# Patient Record
Sex: MALE | Race: WHITE | Employment: OTHER | URBAN - METROPOLITAN AREA
[De-identification: names, ages, dates, MRNs, and addresses within clinical notes are randomized per-mention and may not be internally consistent; named-entity substitution may affect disease eponyms.]

---

## 2017-01-11 RX ORDER — CLONAZEPAM 0.5 MG/1
0.5 TABLET ORAL NIGHTLY PRN
Qty: 30 TABLET | Refills: 0 | Status: SHIPPED | OUTPATIENT
Start: 2017-01-11

## 2017-05-02 PROBLEM — R00.2 HEART PALPITATIONS: Status: ACTIVE | Noted: 2017-05-02

## 2017-05-03 ENCOUNTER — OFFICE VISIT (OUTPATIENT)
Dept: CARDIOLOGY | Age: 67
End: 2017-05-03

## 2017-05-03 VITALS
BODY MASS INDEX: 25.71 KG/M2 | WEIGHT: 194 LBS | HEART RATE: 58 BPM | HEIGHT: 73 IN | RESPIRATION RATE: 12 BRPM | OXYGEN SATURATION: 98 % | DIASTOLIC BLOOD PRESSURE: 60 MMHG | SYSTOLIC BLOOD PRESSURE: 110 MMHG

## 2017-05-03 DIAGNOSIS — I48.91 ATRIAL FIBRILLATION, UNSPECIFIED TYPE (HCC): Primary | ICD-10-CM

## 2017-05-03 DIAGNOSIS — I10 ESSENTIAL HYPERTENSION: ICD-10-CM

## 2017-05-03 PROCEDURE — 99213 OFFICE O/P EST LOW 20 MIN: CPT | Performed by: INTERNAL MEDICINE

## 2017-05-03 RX ORDER — METOPROLOL SUCCINATE 25 MG/1
12.5 TABLET, EXTENDED RELEASE ORAL NIGHTLY
Refills: 0 | COMMUNITY
Start: 2017-04-28

## 2017-05-03 ASSESSMENT — ENCOUNTER SYMPTOMS
CHEST TIGHTNESS: 0
SHORTNESS OF BREATH: 0

## 2017-05-23 ENCOUNTER — HOSPITAL ENCOUNTER (OUTPATIENT)
Dept: NON INVASIVE DIAGNOSTICS | Age: 67
Discharge: HOME | End: 2017-05-23
Payer: COMMERCIAL

## 2017-05-24 PROBLEM — I48.91 ATRIAL FIBRILLATION (HCC): Status: ACTIVE | Noted: 2017-05-02

## 2017-05-31 ENCOUNTER — TELEPHONE (OUTPATIENT)
Dept: CARDIOLOGY | Age: 67
End: 2017-05-31

## 2017-09-01 ENCOUNTER — HOSPITAL ENCOUNTER (OUTPATIENT)
Dept: LAB | Age: 67
Discharge: HOME | End: 2017-09-01
Payer: COMMERCIAL

## 2017-09-01 LAB
ALBUMIN SERPL-MCNC: 4.6 G/DL (ref 3.9–4.9)
ALP BLD-CCNC: 42 U/L (ref 35–104)
ALT SERPL-CCNC: 10 U/L (ref 0–41)
ANION GAP SERPL CALCULATED.3IONS-SCNC: 13 MEQ/L (ref 7–13)
ANISOCYTOSIS: ABNORMAL
AST SERPL-CCNC: 18 U/L (ref 0–40)
ATYPICAL LYMPHOCYTE RELATIVE PERCENT: 1 %
BASOPHILS ABSOLUTE: 0 K/UL (ref 0–0.2)
BASOPHILS RELATIVE PERCENT: 1 %
BILIRUB SERPL-MCNC: 0.8 MG/DL (ref 0–1.2)
BUN BLDV-MCNC: 17 MG/DL (ref 8–23)
CALCIUM SERPL-MCNC: 9.5 MG/DL (ref 8.6–10.2)
CHLORIDE BLD-SCNC: 100 MEQ/L (ref 98–107)
CHOLESTEROL, TOTAL: 144 MG/DL (ref 0–199)
CO2: 26 MEQ/L (ref 22–29)
CREAT SERPL-MCNC: 0.73 MG/DL (ref 0.7–1.2)
EOSINOPHILS ABSOLUTE: 0 K/UL (ref 0–0.7)
EOSINOPHILS RELATIVE PERCENT: 1 %
GFR AFRICAN AMERICAN: >60
GFR NON-AFRICAN AMERICAN: >60
GLOBULIN: 2 G/DL (ref 2.3–3.5)
GLUCOSE BLD-MCNC: 102 MG/DL (ref 74–109)
HCT VFR BLD CALC: 45.5 % (ref 42–52)
HDLC SERPL-MCNC: 76 MG/DL (ref 40–59)
HEMOGLOBIN: 15.2 G/DL (ref 14–18)
HYPOCHROMIA: 0
LDL CHOLESTEROL CALCULATED: 56 MG/DL (ref 0–129)
LYMPHOCYTES ABSOLUTE: 1 K/UL (ref 1–4.8)
LYMPHOCYTES RELATIVE PERCENT: 26 %
MACROCYTES: 0
MAGNESIUM: 2.4 MG/DL (ref 1.7–2.3)
MCH RBC QN AUTO: 30.6 PG (ref 27–31.3)
MCHC RBC AUTO-ENTMCNC: 33.4 % (ref 33–37)
MCV RBC AUTO: 91.7 FL (ref 80–100)
MICROCYTES: ABNORMAL
MONOCYTES ABSOLUTE: 0.4 K/UL (ref 0.2–0.8)
MONOCYTES RELATIVE PERCENT: 11.3 %
NEUTROPHILS ABSOLUTE: 2.2 K/UL (ref 1.4–6.5)
NEUTROPHILS RELATIVE PERCENT: 59 %
PDW BLD-RTO: 13.7 % (ref 11.5–14.5)
PLATELET # BLD: 232 K/UL (ref 130–400)
PLATELET SLIDE REVIEW: ADEQUATE
POIKILOCYTES: 0
POLYCHROMASIA: 0
POTASSIUM SERPL-SCNC: 4.6 MEQ/L (ref 3.5–5.1)
RBC # BLD: 4.96 M/UL (ref 4.7–6.1)
SMUDGE CELLS: 2.8
SODIUM BLD-SCNC: 139 MEQ/L (ref 132–144)
T3 FREE: 3 PG/ML (ref 2–4.4)
TOTAL PROTEIN: 6.6 G/DL (ref 6.4–8.1)
TRIGL SERPL-MCNC: 58 MG/DL (ref 0–200)
TSH REFLEX: 2 UIU/ML (ref 0.27–4.2)
VITAMIN B-12: 560 PG/ML (ref 211–946)
WBC # BLD: 3.8 K/UL (ref 4.8–10.8)

## 2017-09-01 PROCEDURE — 85025 COMPLETE CBC W/AUTO DIFF WBC: CPT

## 2017-09-01 PROCEDURE — 36415 COLL VENOUS BLD VENIPUNCTURE: CPT

## 2017-09-01 PROCEDURE — 82607 VITAMIN B-12: CPT

## 2017-09-01 PROCEDURE — 80053 COMPREHEN METABOLIC PANEL: CPT

## 2017-09-01 PROCEDURE — 84443 ASSAY THYROID STIM HORMONE: CPT

## 2017-09-01 PROCEDURE — 80061 LIPID PANEL: CPT

## 2017-09-01 PROCEDURE — 84481 FREE ASSAY (FT-3): CPT

## 2017-09-01 PROCEDURE — 83735 ASSAY OF MAGNESIUM: CPT

## 2020-09-30 ENCOUNTER — HOSPITAL ENCOUNTER (EMERGENCY)
Age: 70
Discharge: HOME | End: 2020-09-30
Attending: FAMILY MEDICINE
Payer: MEDICARE

## 2020-09-30 VITALS
HEART RATE: 56 BPM | BODY MASS INDEX: 25.71 KG/M2 | DIASTOLIC BLOOD PRESSURE: 73 MMHG | HEIGHT: 73 IN | SYSTOLIC BLOOD PRESSURE: 121 MMHG | TEMPERATURE: 97.9 F | OXYGEN SATURATION: 98 % | RESPIRATION RATE: 17 BRPM | WEIGHT: 194 LBS

## 2020-09-30 LAB
ALBUMIN SERPL-MCNC: 4.2 G/DL (ref 3.5–4.6)
ALP BLD-CCNC: 47 U/L (ref 35–104)
ALT SERPL-CCNC: 11 U/L (ref 0–41)
ANION GAP SERPL CALCULATED.3IONS-SCNC: 7 MEQ/L (ref 9–15)
APTT: 35.4 SEC (ref 24.4–36.8)
AST SERPL-CCNC: 16 U/L (ref 0–40)
BASOPHILS ABSOLUTE: 0 K/UL (ref 0–0.2)
BASOPHILS RELATIVE PERCENT: 0.8 %
BILIRUB SERPL-MCNC: 0.4 MG/DL (ref 0.2–0.7)
BUN BLDV-MCNC: 18 MG/DL (ref 8–23)
CALCIUM SERPL-MCNC: 9.6 MG/DL (ref 8.5–9.9)
CHLORIDE BLD-SCNC: 104 MEQ/L (ref 95–107)
CO2: 29 MEQ/L (ref 20–31)
CREAT SERPL-MCNC: 0.84 MG/DL (ref 0.7–1.2)
EKG ATRIAL RATE: 62 BPM
EKG P AXIS: 19 DEGREES
EKG P-R INTERVAL: 176 MS
EKG Q-T INTERVAL: 428 MS
EKG QRS DURATION: 100 MS
EKG QTC CALCULATION (BAZETT): 434 MS
EKG R AXIS: -28 DEGREES
EKG T AXIS: 34 DEGREES
EKG VENTRICULAR RATE: 62 BPM
EOSINOPHILS ABSOLUTE: 0 K/UL (ref 0–0.7)
EOSINOPHILS RELATIVE PERCENT: 1 %
GFR AFRICAN AMERICAN: >60
GFR NON-AFRICAN AMERICAN: >60
GLOBULIN: 3.2 G/DL (ref 2.3–3.5)
GLUCOSE BLD-MCNC: 100 MG/DL (ref 70–99)
HCT VFR BLD CALC: 43.3 % (ref 42–52)
HEMOGLOBIN: 14.5 G/DL (ref 14–18)
INR BLD: 1.1
LYMPHOCYTES ABSOLUTE: 1.2 K/UL (ref 1–4.8)
LYMPHOCYTES RELATIVE PERCENT: 26.6 %
MCH RBC QN AUTO: 31.2 PG (ref 27–31.3)
MCHC RBC AUTO-ENTMCNC: 33.4 % (ref 33–37)
MCV RBC AUTO: 93.4 FL (ref 80–100)
MONOCYTES ABSOLUTE: 0.3 K/UL (ref 0.2–0.8)
MONOCYTES RELATIVE PERCENT: 6.7 %
NEUTROPHILS ABSOLUTE: 3 K/UL (ref 1.4–6.5)
NEUTROPHILS RELATIVE PERCENT: 64.9 %
PDW BLD-RTO: 13.8 % (ref 11.5–14.5)
PLATELET # BLD: 257 K/UL (ref 130–400)
POTASSIUM SERPL-SCNC: 3.8 MEQ/L (ref 3.4–4.9)
PROTHROMBIN TIME: 14.2 SEC (ref 12.3–14.9)
RBC # BLD: 4.63 M/UL (ref 4.7–6.1)
SODIUM BLD-SCNC: 140 MEQ/L (ref 135–144)
TOTAL CK: 80 U/L (ref 0–190)
TOTAL PROTEIN: 7.4 G/DL (ref 6.3–8)
TROPONIN: <0.01 NG/ML (ref 0–0.01)
WBC # BLD: 4.6 K/UL (ref 4.8–10.8)

## 2020-09-30 PROCEDURE — 85730 THROMBOPLASTIN TIME PARTIAL: CPT

## 2020-09-30 PROCEDURE — 36415 COLL VENOUS BLD VENIPUNCTURE: CPT

## 2020-09-30 PROCEDURE — 85025 COMPLETE CBC W/AUTO DIFF WBC: CPT

## 2020-09-30 PROCEDURE — 85610 PROTHROMBIN TIME: CPT

## 2020-09-30 PROCEDURE — 84484 ASSAY OF TROPONIN QUANT: CPT

## 2020-09-30 PROCEDURE — 93005 ELECTROCARDIOGRAM TRACING: CPT | Performed by: FAMILY MEDICINE

## 2020-09-30 PROCEDURE — 99285 EMERGENCY DEPT VISIT HI MDM: CPT

## 2020-09-30 PROCEDURE — 82550 ASSAY OF CK (CPK): CPT

## 2020-09-30 PROCEDURE — 80053 COMPREHEN METABOLIC PANEL: CPT

## 2020-09-30 PROCEDURE — 99284 EMERGENCY DEPT VISIT MOD MDM: CPT

## 2020-09-30 RX ORDER — PRAVASTATIN SODIUM 10 MG
5 TABLET ORAL DAILY
COMMUNITY

## 2020-09-30 RX ORDER — LOSARTAN POTASSIUM 25 MG/1
25 TABLET ORAL DAILY
COMMUNITY

## 2020-09-30 ASSESSMENT — PAIN DESCRIPTION - ORIENTATION: ORIENTATION: MID

## 2020-09-30 ASSESSMENT — ENCOUNTER SYMPTOMS
EYES NEGATIVE: 1
RESPIRATORY NEGATIVE: 1
ALLERGIC/IMMUNOLOGIC NEGATIVE: 1

## 2020-09-30 ASSESSMENT — HEART SCORE: ECG: 0

## 2020-09-30 ASSESSMENT — PAIN SCALES - GENERAL: PAINLEVEL_OUTOF10: 2

## 2020-09-30 ASSESSMENT — PAIN DESCRIPTION - FREQUENCY: FREQUENCY: CONTINUOUS

## 2020-09-30 ASSESSMENT — PAIN DESCRIPTION - PAIN TYPE: TYPE: ACUTE PAIN

## 2020-09-30 ASSESSMENT — PAIN DESCRIPTION - LOCATION: LOCATION: CHEST

## 2020-09-30 ASSESSMENT — PAIN DESCRIPTION - DESCRIPTORS: DESCRIPTORS: ACHING;DULL;PRESSURE

## 2020-09-30 NOTE — ED NOTES
Discharge instructions explained to patient with verbalization of understanding. Patient ambulatory with a steady gait.       Viola Hidalgo RN  09/30/20 8666

## 2020-09-30 NOTE — ED TRIAGE NOTES
A & Ox4. Skin pink warm and dry. Points to center of chest for pains. Denies radiation. Denies N/V/SOB/diaphoresis/dizziness. States pain is worse at the top of a deep breath.

## 2020-09-30 NOTE — ED PROVIDER NOTES
[facility] ED  eMERGENCY dEPARTMENT eNCOUnter      Pt Name: Radha Ruff  MRN: [id number]  [place] 1950  Date of evaluation: 9/30/2020  Provider: Karina Herrmann MD    [address] [place] [address]       Chief Complaint   Patient presents with    Chest Pain     x 2 days off and on, being pretty constant since last night         HISTORY OF PRESENT ILLNESS   (Location/Symptom, Timing/Onset,Context/Setting, Quality, Duration, Modifying Factors, Severity)  Note limiting factors. Radha Ruff is a 69 y.o. male who presents to the emergency department      The history is provided by the patient. Chest Pain   Pain location:  L chest  Pain quality: aching    Pain radiates to:  Does not radiate  Pain severity:  Moderate  Timing:  Constant  Progression:  Waxing and waning  Chronicity:  New  Context: not breathing, not drug use, not eating, not intercourse, not lifting, not movement, not raising an arm, not at rest, not stress and not trauma        NursingNotes were reviewed. REVIEW OF SYSTEMS    (2-9 systems for level 4, 10 or more for level 5)     Review of Systems   Constitutional: Negative. HENT: Negative. Eyes: Negative. Respiratory: Negative. Cardiovascular: Positive for chest pain. Endocrine: Negative. Genitourinary: Negative. Musculoskeletal: Negative. Skin: Negative. Allergic/Immunologic: Negative. Neurological: Negative. Hematological: Negative. Psychiatric/Behavioral: Negative. All other systems reviewed and are negative. Except as noted above the remainder of the review of systems was reviewed and negative.        PAST MEDICAL HISTORY     Past Medical History:   Diagnosis Date    Atrial fibrillation ([address] 5/2/2017    Dysthymia     Hyperlipidemia     Hypertension     Plantar fasciitis     RT          SURGICALHISTORY       Past Surgical History:   Procedure Laterality Date    CATARACT REMOVAL WITH IMPLANT Bilateral 01/2016    TONSILLECTOMY CURRENT MEDICATIONS       Previous Medications    APIXABAN (ELIQUIS) 5 MG TABS TABLET    Take 5 mg by mouth 2 times daily    CLONAZEPAM (KLONOPIN) 0.5 MG TABLET    Take 1 tablet by mouth nightly as needed for Anxiety    LISINOPRIL (PRINIVIL;ZESTRIL) 10 MG TABLET    0.5 tab PO a day    LOSARTAN (COZAAR) 25 MG TABLET    Take 25 mg by mouth daily    METOPROLOL SUCCINATE (TOPROL XL) 25 MG EXTENDED RELEASE TABLET    12.5 mg nightly    PRAVASTATIN (PRAVACHOL) 10 MG TABLET    Take 5 mg by mouth daily Takes 5mg and then every 3rd day takes 10mg    SIMVASTATIN (ZOCOR) 10 MG TABLET    Take 1 tablet by mouth daily       ALLERGIES     Patient has no known allergies.     FAMILY HISTORY       Family History   Problem Relation Age of Onset    Heart Disease Mother     Cancer Mother         breast/bone mets    Alzheimer's Disease Father     Dementia Father           SOCIAL HISTORY       Social History     Socioeconomic History    Marital status:      Spouse name: None    Number of children: None    Years of education: None    Highest education level: None   Occupational History    None   Social Needs    Financial resource strain: None    Food insecurity     Worry: None     Inability: None    Transportation needs     Medical: None     Non-medical: None   Tobacco Use    Smoking status: Never Smoker    Smokeless tobacco: Never Used   Substance and Sexual Activity    Alcohol use: Yes     Comment: rarely     Drug use: Not Currently    Sexual activity: Yes     Partners: Female   Lifestyle    Physical activity     Days per week: None     Minutes per session: None    Stress: None   Relationships    Social connections     Talks on phone: None     Gets together: None     Attends Congregational service: None     Active member of club or organization: None     Attends meetings of clubs or organizations: None     Relationship status: None    Intimate partner violence     Fear of current or ex partner: None Emotionally abused: None     Physically abused: None     Forced sexual activity: None   Other Topics Concern    None   Social History Narrative    None       SCREENINGS      @FLOW([id number])@      PHYSICAL EXAM    (up to 7 for level 4, 8 or more for level 5)     ED Triage Vitals [09/30/20 1349]   BP Temp Temp Source Pulse Resp SpO2 Height Weight   (!) 163/91 97.9 °F (36.6 °C) Oral 65 16 98 % 6' 1\" (1.854 m) 194 lb (88 kg)       Physical Exam  Vitals signs and nursing note reviewed. Constitutional:       Appearance: He is well-developed. HENT:      Head: Normocephalic and atraumatic. Right Ear: External ear normal.      Left Ear: External ear normal.      Nose: Nose normal.      Mouth/Throat:      Mouth: Mucous membranes are moist.   Eyes:      Extraocular Movements: Extraocular movements intact. Pupils: Pupils are equal, round, and reactive to light. Neck:      Musculoskeletal: Normal range of motion and neck supple. Cardiovascular:      Rate and Rhythm: Normal rate and regular rhythm. Heart sounds: Normal heart sounds. Pulmonary:      Effort: Pulmonary effort is normal. No respiratory distress. Breath sounds: Normal breath sounds. No stridor. No wheezing, rhonchi or rales. Chest:      Chest wall: No tenderness. Abdominal:      General: Bowel sounds are normal.      Palpations: Abdomen is soft. Musculoskeletal: Normal range of motion. Skin:     General: Skin is warm and dry. Neurological:      General: No focal deficit present. Mental Status: He is alert and oriented to person, place, and time. Cranial Nerves: No cranial nerve deficit. Sensory: No sensory deficit. Motor: No weakness or abnormal muscle tone. Coordination: Coordination normal.      Gait: Gait normal.      Deep Tendon Reflexes: Reflexes normal.   Psychiatric:         Mood and Affect: Mood normal.         Behavior: Behavior normal.         Thought Content:  Thought content normal. Judgment: Judgment normal.         DIAGNOSTIC RESULTS     EKG: All EKG's are interpreted by the Emergency Department Physician who either signs or Co-signsthis chart in the absence of a cardiologist.    EKG: normal EKG, normal sinus rhythm, rate 62 and no change for previous EKG  . RADIOLOGY:   Non-plain filmimages such as CT, Ultrasound and MRI are read by the radiologist. Plain radiographic images are visualized and preliminarily interpreted by the emergency physician with the below findings:         Interpretation per the Radiologist below, if available at the time ofthis note:    No orders to display         ED BEDSIDE ULTRASOUND:   Performed by ED Physician - none    LABS:  Labs Reviewed   COMPREHENSIVE METABOLIC PANEL - Abnormal; Notable for the following components:       Result Value    Anion Gap 7 (*)     Glucose 100 (*)     All other components within normal limits   CBC WITH AUTO DIFFERENTIAL - Abnormal; Notable for the following components:    WBC 4.6 (*)     RBC 4.63 (*)     All other components within normal limits   PROTIME-INR   APTT   CK   TROPONIN       All other labs were within normal range or not returned as of this dictation. EMERGENCY DEPARTMENT COURSE and DIFFERENTIAL DIAGNOSIS/MDM:   Vitals:    Vitals:    09/30/20 1530 09/30/20 1544 09/30/20 1600 09/30/20 1630   BP: (!) 149/94 (!) 149/94 133/81 135/84   Pulse: 54 56 60 57   Resp: 18 18 14 14   Temp:       TempSrc:       SpO2: 98%  98% 98%   Weight:       Height:                    MDM  Number of Diagnoses or Management Options  Diagnosis management comments: The patient presents with chest pain. The patient has been evaluated and the history and physical exam suggest a benign etiology. I see nothing to suggest coronary ischemia, myocardial infarction, pulmonary embolism, thoracic aortic dissection, significant pericarditis, pneumonia, pneumothorax, or acute abdomen.   I feel the patient can be safely discharged to home with outpatient follow up. Instructions have been given for the patient to return to the ED for any worsening of the symptoms, including but not limited to increased pain, shortness of breath, abdominal pain or weakness. Patient was offered to stay for observation overnight but he has known history of hypertension A. fib controlled failure and preferred to follow-up with his cardiologist Dr. Carol Ann Odonnell in the morning advised to come to the ER if any worsening or new symptom       Amount and/or Complexity of Data Reviewed  Clinical lab tests: ordered and reviewed  Tests in the radiology section of CPT®: ordered and reviewed      CONSULTS:  None    PROCEDURES:  Unless otherwise noted below, none     Procedures    FINAL IMPRESSION      1.  Atypical chest pain          DISPOSITION/PLAN   DISPOSITION        PATIENT REFERRED TO:  Lorri Bustillos MD  [facility] [address]  [phone]    In 2 days        DISCHARGE MEDICATIONS:  New Prescriptions    No medications on file          (Please note thatportions of this note were completed with a voice recognition program.  Efforts were made to edit the dictations but occasionally words are mis-transcribed.)    Savanna Roa MD (electronically signed)  Attending Emergency Physician          Davy Lopez MD  09/30/20 4300

## 2020-09-30 NOTE — ED NOTES
Pt states his pain is about 1/10 at this time. Aware we are waiting on rest of results. Pt has call light within reach. No further complaints.       Emily Gregorio RN  09/30/20 5999

## 2020-10-03 PROCEDURE — 93010 ELECTROCARDIOGRAM REPORT: CPT | Performed by: INTERNAL MEDICINE

## 2022-07-16 ENCOUNTER — HOSPITAL ENCOUNTER (EMERGENCY)
Age: 72
Discharge: HOME | End: 2022-07-17
Payer: MEDICARE

## 2022-07-16 ENCOUNTER — APPOINTMENT (OUTPATIENT)
Dept: GENERAL RADIOLOGY | Age: 72
End: 2022-07-16
Payer: MEDICARE

## 2022-07-16 DIAGNOSIS — R07.9 CHEST PAIN, UNSPECIFIED TYPE: Primary | ICD-10-CM

## 2022-07-16 LAB
ALBUMIN SERPL-MCNC: 4.3 G/DL (ref 3.5–4.6)
ALP BLD-CCNC: 54 U/L (ref 35–104)
ALT SERPL-CCNC: 15 U/L (ref 0–41)
ANION GAP SERPL CALCULATED.3IONS-SCNC: 10 MEQ/L (ref 9–15)
AST SERPL-CCNC: 20 U/L (ref 0–40)
BASOPHILS ABSOLUTE: 0 K/UL (ref 0–0.2)
BASOPHILS RELATIVE PERCENT: 0.7 %
BILIRUB SERPL-MCNC: <0.2 MG/DL (ref 0.2–0.7)
BUN BLDV-MCNC: 23 MG/DL (ref 8–23)
CALCIUM SERPL-MCNC: 9.3 MG/DL (ref 8.5–9.9)
CHLORIDE BLD-SCNC: 103 MEQ/L (ref 95–107)
CO2: 25 MEQ/L (ref 20–31)
CREAT SERPL-MCNC: 0.89 MG/DL (ref 0.7–1.2)
EOSINOPHILS ABSOLUTE: 0.1 K/UL (ref 0–0.7)
EOSINOPHILS RELATIVE PERCENT: 1.5 %
GFR AFRICAN AMERICAN: >60
GFR NON-AFRICAN AMERICAN: >60
GLOBULIN: 3 G/DL (ref 2.3–3.5)
GLUCOSE BLD-MCNC: 112 MG/DL (ref 70–99)
HCT VFR BLD CALC: 43.3 % (ref 42–52)
HEMOGLOBIN: 14.6 G/DL (ref 14–18)
LYMPHOCYTES ABSOLUTE: 1.8 K/UL (ref 1–4.8)
LYMPHOCYTES RELATIVE PERCENT: 38.8 %
MAGNESIUM: 2.4 MG/DL (ref 1.7–2.4)
MCH RBC QN AUTO: 31.4 PG (ref 27–31.3)
MCHC RBC AUTO-ENTMCNC: 33.8 % (ref 33–37)
MCV RBC AUTO: 93.1 FL (ref 80–100)
MONOCYTES ABSOLUTE: 0.4 K/UL (ref 0.2–0.8)
MONOCYTES RELATIVE PERCENT: 8.4 %
NEUTROPHILS ABSOLUTE: 2.3 K/UL (ref 1.4–6.5)
NEUTROPHILS RELATIVE PERCENT: 50.6 %
PDW BLD-RTO: 14.1 % (ref 11.5–14.5)
PLATELET # BLD: 245 K/UL (ref 130–400)
POTASSIUM SERPL-SCNC: 4 MEQ/L (ref 3.4–4.9)
RBC # BLD: 4.65 M/UL (ref 4.7–6.1)
SODIUM BLD-SCNC: 138 MEQ/L (ref 135–144)
TOTAL PROTEIN: 7.3 G/DL (ref 6.3–8)
TROPONIN: <0.01 NG/ML (ref 0–0.01)
WBC # BLD: 4.6 K/UL (ref 4.8–10.8)

## 2022-07-16 PROCEDURE — 85025 COMPLETE CBC W/AUTO DIFF WBC: CPT

## 2022-07-16 PROCEDURE — 93005 ELECTROCARDIOGRAM TRACING: CPT | Performed by: STUDENT IN AN ORGANIZED HEALTH CARE EDUCATION/TRAINING PROGRAM

## 2022-07-16 PROCEDURE — 84484 ASSAY OF TROPONIN QUANT: CPT

## 2022-07-16 PROCEDURE — 80053 COMPREHEN METABOLIC PANEL: CPT

## 2022-07-16 PROCEDURE — 99285 EMERGENCY DEPT VISIT HI MDM: CPT

## 2022-07-16 PROCEDURE — 2580000003 HC RX 258: Performed by: STUDENT IN AN ORGANIZED HEALTH CARE EDUCATION/TRAINING PROGRAM

## 2022-07-16 PROCEDURE — 71045 X-RAY EXAM CHEST 1 VIEW: CPT

## 2022-07-16 PROCEDURE — 36415 COLL VENOUS BLD VENIPUNCTURE: CPT

## 2022-07-16 PROCEDURE — 83735 ASSAY OF MAGNESIUM: CPT

## 2022-07-16 RX ORDER — 0.9 % SODIUM CHLORIDE 0.9 %
1000 INTRAVENOUS SOLUTION INTRAVENOUS ONCE
Status: COMPLETED | OUTPATIENT
Start: 2022-07-16 | End: 2022-07-17

## 2022-07-16 RX ORDER — ASPIRIN 325 MG
325 TABLET ORAL ONCE
Status: DISCONTINUED | OUTPATIENT
Start: 2022-07-16 | End: 2022-07-16

## 2022-07-16 RX ADMIN — SODIUM CHLORIDE 1000 ML: 9 INJECTION, SOLUTION INTRAVENOUS at 23:31

## 2022-07-16 ASSESSMENT — PAIN - FUNCTIONAL ASSESSMENT: PAIN_FUNCTIONAL_ASSESSMENT: 0-10

## 2022-07-16 ASSESSMENT — PAIN DESCRIPTION - ORIENTATION: ORIENTATION: LEFT

## 2022-07-16 ASSESSMENT — ENCOUNTER SYMPTOMS
SHORTNESS OF BREATH: 0
CHEST TIGHTNESS: 0
NAUSEA: 0
DIARRHEA: 0
SORE THROAT: 0
BACK PAIN: 0
EYE PAIN: 0

## 2022-07-16 ASSESSMENT — PAIN DESCRIPTION - LOCATION: LOCATION: CHEST;SHOULDER

## 2022-07-16 ASSESSMENT — PAIN SCALES - GENERAL: PAINLEVEL_OUTOF10: 3

## 2022-07-17 VITALS
WEIGHT: 186 LBS | HEART RATE: 59 BPM | DIASTOLIC BLOOD PRESSURE: 89 MMHG | TEMPERATURE: 98.3 F | OXYGEN SATURATION: 97 % | HEIGHT: 73 IN | RESPIRATION RATE: 16 BRPM | BODY MASS INDEX: 24.65 KG/M2 | SYSTOLIC BLOOD PRESSURE: 123 MMHG

## 2022-07-17 LAB — TROPONIN: <0.01 NG/ML (ref 0–0.01)

## 2022-07-17 PROCEDURE — 84484 ASSAY OF TROPONIN QUANT: CPT

## 2022-07-17 PROCEDURE — 36415 COLL VENOUS BLD VENIPUNCTURE: CPT

## 2022-07-17 PROCEDURE — 93005 ELECTROCARDIOGRAM TRACING: CPT | Performed by: STUDENT IN AN ORGANIZED HEALTH CARE EDUCATION/TRAINING PROGRAM

## 2022-07-17 ASSESSMENT — PAIN DESCRIPTION - LOCATION: LOCATION: CHEST

## 2022-07-17 ASSESSMENT — PAIN - FUNCTIONAL ASSESSMENT
PAIN_FUNCTIONAL_ASSESSMENT: ACTIVITIES ARE NOT PREVENTED
PAIN_FUNCTIONAL_ASSESSMENT: 0-10
PAIN_FUNCTIONAL_ASSESSMENT: NONE - DENIES PAIN

## 2022-07-17 ASSESSMENT — PAIN DESCRIPTION - FREQUENCY: FREQUENCY: CONTINUOUS

## 2022-07-17 ASSESSMENT — PAIN SCALES - GENERAL: PAINLEVEL_OUTOF10: 3

## 2022-07-17 ASSESSMENT — PAIN DESCRIPTION - PAIN TYPE: TYPE: ACUTE PAIN

## 2022-07-17 ASSESSMENT — PAIN DESCRIPTION - ONSET: ONSET: ON-GOING

## 2022-07-17 ASSESSMENT — PAIN DESCRIPTION - ORIENTATION: ORIENTATION: LEFT

## 2022-07-17 NOTE — ED TRIAGE NOTES
Patient to ED via [facility] for c/o of chest pain x 45 minutes. Patient has hx of afib. Denies hx of heart attack. Patient Skin p/w/d. Respirations even and unlabored. No acute distress noted at this time. Pa Chip at bedside.

## 2022-07-17 NOTE — ED PROVIDER NOTES
[address] [facility] ED  eMERGENCYdEPARTMENT eNCOUnter      Pt Name: Nehal Hernandez  MRN: [id number]  [place] 1950  Date of evaluation: 7/16/2022  Millie Ross PA-C    CHIEF COMPLAINT           HPI  Nehal Hernandez is a 67 y.o. male per chart review has a h/o hypertension, hyperlipidemia, A. fib presenting with chest pain. Patient reports gradual onset, worsening, moderate, substernal chest pressure that radiated to his left side and associated with diaphoresis and weakness. He states he knows when he is in A. fib and this is not it. He denies shortness of breath, fever, chills, abdominal pain, back pain, nausea, vomiting. ROS  Review of Systems   Constitutional:  Positive for diaphoresis. Negative for chills, fatigue and fever. HENT:  Negative for ear pain, hearing loss and sore throat. Eyes:  Negative for pain and visual disturbance. Respiratory:  Negative for chest tightness and shortness of breath. Cardiovascular:  Positive for chest pain. Gastrointestinal:  Negative for diarrhea and nausea. Endocrine: Negative for cold intolerance. Genitourinary:  Negative for hematuria. Musculoskeletal:  Negative for back pain. Skin:  Negative for rash and wound. Neurological:  Negative for dizziness and headaches. Psychiatric/Behavioral:  Negative for behavioral problems and confusion. Except as noted above the remainder of the review of systems was reviewed and negative.        PAST MEDICAL HISTORY     Past Medical History:   Diagnosis Date    Atrial fibrillation ([facility] [address] 5/2/2017    Dysthymia     Hyperlipidemia     Hypertension     Plantar fasciitis     RT          SURGICAL HISTORY       Past Surgical History:   Procedure Laterality Date    CATARACT EXTRACTION W/  INTRAOCULAR LENS IMPLANT Bilateral 01/2016    TONSILLECTOMY           CURRENTMEDICATIONS       Discharge Medication List as of 7/17/2022  1:55 AM        CONTINUE these medications which have NOT CHANGED Details   losartan (COZAAR) 25 MG tablet Take 25 mg by mouth dailyHistorical Med      apixaban (ELIQUIS) 5 MG TABS tablet Take 5 mg by mouth 2 times dailyHistorical Med      pravastatin (PRAVACHOL) 10 MG tablet Take 5 mg by mouth daily Takes 5mg and then every 3rd day takes 10mgHistorical Med      metoprolol succinate (TOPROL XL) 25 MG extended release tablet 12.5 mg nightly, R-0Historical Med      clonazePAM (KLONOPIN) 0.5 MG tablet Take 1 tablet by mouth nightly as needed for Anxiety, Disp-30 tablet, R-0Print      lisinopril (PRINIVIL;ZESTRIL) 10 MG tablet 0.5 tab PO a day, Disp-90 tablet, R-3      simvastatin (ZOCOR) 10 MG tablet Take 1 tablet by mouth daily, Disp-90 tablet, R-3             ALLERGIES     Patient has no known allergies. FAMILY HISTORY       Family History   Problem Relation Age of Onset    Heart Disease Mother     Cancer Mother         breast/bone mets    Alzheimer's Disease Father     Dementia Father           SOCIAL HISTORY       Social History     Socioeconomic History    Marital status:    Tobacco Use    Smoking status: Never    Smokeless tobacco: Never   Vaping Use    Vaping Use: Never used   Substance and Sexual Activity    Alcohol use: Yes     Comment: rarely     Drug use: Not Currently    Sexual activity: Yes     Partners: Female         PHYSICAL EXAM       ED Triage Vitals [07/16/22 2302]   BP Temp Temp src Heart Rate Resp SpO2 Height Weight   -- 98.3 °F (36.8 °C) -- 53 18 100 % 6' 1\" (1.854 m) 186 lb (84.4 kg)       Physical Exam  Constitutional:       Appearance: Normal appearance. HENT:      Head: Normocephalic and atraumatic. Nose: Nose normal.      Mouth/Throat:      Mouth: Mucous membranes are moist.      Pharynx: No oropharyngeal exudate or posterior oropharyngeal erythema. Eyes:      Extraocular Movements: Extraocular movements intact. Conjunctiva/sclera: Conjunctivae normal.      Pupils: Pupils are equal, round, and reactive to light.    Cardiovascular: Rate and Rhythm: Normal rate and regular rhythm. Heart sounds: Normal heart sounds. Pulmonary:      Effort: Pulmonary effort is normal.      Breath sounds: Normal breath sounds. No wheezing or rhonchi. Abdominal:      General: Bowel sounds are normal.      Palpations: Abdomen is soft. Tenderness: There is no abdominal tenderness. There is no guarding. Musculoskeletal:         General: No deformity. Normal range of motion. Cervical back: Normal range of motion and neck supple. Skin:     General: Skin is warm and dry. Coloration: Skin is not jaundiced. Neurological:      General: No focal deficit present. Mental Status: He is alert and oriented to person, place, and time. Psychiatric:         Mood and Affect: Mood normal.         Behavior: Behavior normal.         MDM  Is a 25-year-old male present with chest pain. Patient is afebrile and hemodynamically stable. EKG shows sinus beverley with HR 56, left axis, normal intervals, no ST changes. Labs unremarkable, troponin negative. Spoke with [facility] cardiology who are comfortable with discharge after repeat troponins are negative. Repeat EKG unchanged and repeat troponin is negative. Patient discharged and is agreeable to follow-up with [facility]. He will return if symptoms change or worsen. FINAL IMPRESSION      1.  Chest pain, unspecified type          DISPOSITION/PLAN   DISPOSITION Decision To Discharge 07/17/2022 01:53:54 AM        DISCHARGE MEDICATIONS:  [unfilled]         Jesslyn Fleischer, PA-C(electronically signed)  Attending Emergency Physician           Jesslyn Fleischer, PA-C  07/17/22 1186

## 2022-07-18 LAB
EKG ATRIAL RATE: 56 BPM
EKG ATRIAL RATE: 56 BPM
EKG P AXIS: 56 DEGREES
EKG P AXIS: 61 DEGREES
EKG P-R INTERVAL: 180 MS
EKG P-R INTERVAL: 188 MS
EKG Q-T INTERVAL: 424 MS
EKG Q-T INTERVAL: 434 MS
EKG QRS DURATION: 102 MS
EKG QRS DURATION: 104 MS
EKG QTC CALCULATION (BAZETT): 409 MS
EKG QTC CALCULATION (BAZETT): 418 MS
EKG R AXIS: -21 DEGREES
EKG R AXIS: -34 DEGREES
EKG T AXIS: 19 DEGREES
EKG T AXIS: 30 DEGREES
EKG VENTRICULAR RATE: 56 BPM
EKG VENTRICULAR RATE: 56 BPM

## 2022-07-18 PROCEDURE — 93010 ELECTROCARDIOGRAM REPORT: CPT | Performed by: INTERNAL MEDICINE

## 2022-11-11 ENCOUNTER — APPOINTMENT (OUTPATIENT)
Dept: GENERAL RADIOLOGY | Age: 72
End: 2022-11-11
Payer: MEDICARE

## 2022-11-11 ENCOUNTER — HOSPITAL ENCOUNTER (EMERGENCY)
Age: 72
Discharge: HOME | End: 2022-11-11
Payer: MEDICARE

## 2022-11-11 VITALS
HEIGHT: 73 IN | SYSTOLIC BLOOD PRESSURE: 135 MMHG | WEIGHT: 186 LBS | HEART RATE: 68 BPM | OXYGEN SATURATION: 100 % | RESPIRATION RATE: 20 BRPM | BODY MASS INDEX: 24.65 KG/M2 | DIASTOLIC BLOOD PRESSURE: 85 MMHG

## 2022-11-11 DIAGNOSIS — R07.89 ATYPICAL CHEST PAIN: Primary | ICD-10-CM

## 2022-11-11 LAB
ADENOVIRUS BY PCR: NOT DETECTED
ALBUMIN SERPL-MCNC: 4 G/DL (ref 3.5–4.6)
ALP BLD-CCNC: 48 U/L (ref 35–104)
ALT SERPL-CCNC: 9 U/L (ref 0–41)
ANION GAP SERPL CALCULATED.3IONS-SCNC: 12 MEQ/L (ref 9–15)
AST SERPL-CCNC: 17 U/L (ref 0–40)
BASOPHILS ABSOLUTE: 0 K/UL (ref 0–0.2)
BASOPHILS RELATIVE PERCENT: 0.5 %
BILIRUB SERPL-MCNC: 0.4 MG/DL (ref 0.2–0.7)
BORDETELLA PARAPERTUSSIS BY PCR: NOT DETECTED
BORDETELLA PERTUSSIS BY PCR: NOT DETECTED
BUN BLDV-MCNC: 21 MG/DL (ref 8–23)
C-REACTIVE PROTEIN: 12 MG/L (ref 0–5)
CALCIUM SERPL-MCNC: 9.1 MG/DL (ref 8.5–9.9)
CHLAMYDOPHILIA PNEUMONIAE BY PCR: NOT DETECTED
CHLORIDE BLD-SCNC: 105 MEQ/L (ref 95–107)
CO2: 23 MEQ/L (ref 20–31)
CORONAVIRUS 229E BY PCR: NOT DETECTED
CORONAVIRUS HKU1 BY PCR: NOT DETECTED
CORONAVIRUS NL63 BY PCR: NOT DETECTED
CORONAVIRUS OC43 BY PCR: NOT DETECTED
CREAT SERPL-MCNC: 0.75 MG/DL (ref 0.7–1.2)
EKG ATRIAL RATE: 67 BPM
EKG P AXIS: 92 DEGREES
EKG P-R INTERVAL: 172 MS
EKG Q-T INTERVAL: 400 MS
EKG QRS DURATION: 92 MS
EKG QTC CALCULATION (BAZETT): 422 MS
EKG R AXIS: -17 DEGREES
EKG T AXIS: 25 DEGREES
EKG VENTRICULAR RATE: 67 BPM
EOSINOPHILS ABSOLUTE: 0.1 K/UL (ref 0–0.7)
EOSINOPHILS RELATIVE PERCENT: 1.6 %
GFR SERPL CREATININE-BSD FRML MDRD: >60 ML/MIN/{1.73_M2}
GLOBULIN: 3.4 G/DL (ref 2.3–3.5)
GLUCOSE BLD-MCNC: 107 MG/DL (ref 70–99)
HCT VFR BLD CALC: 41.3 % (ref 42–52)
HEMOGLOBIN: 14.2 G/DL (ref 14–18)
HUMAN METAPNEUMOVIRUS BY PCR: NOT DETECTED
HUMAN RHINOVIRUS/ENTEROVIRUS BY PCR: NOT DETECTED
INFLUENZA A BY PCR: NOT DETECTED
INFLUENZA B BY PCR: NOT DETECTED
LYMPHOCYTES ABSOLUTE: 1 K/UL (ref 1–4.8)
LYMPHOCYTES RELATIVE PERCENT: 16.7 %
MAGNESIUM: 2.3 MG/DL (ref 1.7–2.4)
MCH RBC QN AUTO: 32 PG (ref 27–31.3)
MCHC RBC AUTO-ENTMCNC: 34.3 % (ref 33–37)
MCV RBC AUTO: 93.2 FL (ref 79–92.2)
MONOCYTES ABSOLUTE: 0.6 K/UL (ref 0.2–0.8)
MONOCYTES RELATIVE PERCENT: 10.8 %
MYCOPLASMA PNEUMONIAE BY PCR: NOT DETECTED
NEUTROPHILS ABSOLUTE: 4.2 K/UL (ref 1.4–6.5)
NEUTROPHILS RELATIVE PERCENT: 70.4 %
PARAINFLUENZA VIRUS 1 BY PCR: NOT DETECTED
PARAINFLUENZA VIRUS 2 BY PCR: NOT DETECTED
PARAINFLUENZA VIRUS 3 BY PCR: NOT DETECTED
PARAINFLUENZA VIRUS 4 BY PCR: NOT DETECTED
PDW BLD-RTO: 14.2 % (ref 11.5–14.5)
PLATELET # BLD: 290 K/UL (ref 130–400)
POTASSIUM SERPL-SCNC: 3.9 MEQ/L (ref 3.4–4.9)
RBC # BLD: 4.43 M/UL (ref 4.7–6.1)
RESPIRATORY SYNCYTIAL VIRUS BY PCR: NOT DETECTED
SARS-COV-2, PCR: NOT DETECTED
SEDIMENTATION RATE, ERYTHROCYTE: 62 MM (ref 0–20)
SODIUM BLD-SCNC: 140 MEQ/L (ref 135–144)
TOTAL PROTEIN: 7.4 G/DL (ref 6.3–8)
TROPONIN: <0.01 NG/ML (ref 0–0.01)
WBC # BLD: 5.9 K/UL (ref 4.8–10.8)

## 2022-11-11 PROCEDURE — 93005 ELECTROCARDIOGRAM TRACING: CPT | Performed by: STUDENT IN AN ORGANIZED HEALTH CARE EDUCATION/TRAINING PROGRAM

## 2022-11-11 PROCEDURE — 93010 ELECTROCARDIOGRAM REPORT: CPT | Performed by: INTERNAL MEDICINE

## 2022-11-11 PROCEDURE — 85025 COMPLETE CBC W/AUTO DIFF WBC: CPT

## 2022-11-11 PROCEDURE — 80053 COMPREHEN METABOLIC PANEL: CPT

## 2022-11-11 PROCEDURE — 86140 C-REACTIVE PROTEIN: CPT

## 2022-11-11 PROCEDURE — 0202U NFCT DS 22 TRGT SARS-COV-2: CPT

## 2022-11-11 PROCEDURE — 83735 ASSAY OF MAGNESIUM: CPT

## 2022-11-11 PROCEDURE — 71045 X-RAY EXAM CHEST 1 VIEW: CPT

## 2022-11-11 PROCEDURE — 85652 RBC SED RATE AUTOMATED: CPT

## 2022-11-11 PROCEDURE — 36415 COLL VENOUS BLD VENIPUNCTURE: CPT

## 2022-11-11 PROCEDURE — 99285 EMERGENCY DEPT VISIT HI MDM: CPT

## 2022-11-11 PROCEDURE — 84484 ASSAY OF TROPONIN QUANT: CPT

## 2022-11-11 RX ORDER — KETOROLAC TROMETHAMINE 30 MG/ML
30 INJECTION, SOLUTION INTRAMUSCULAR; INTRAVENOUS ONCE
Status: DISCONTINUED | OUTPATIENT
Start: 2022-11-11 | End: 2022-11-11

## 2022-11-11 RX ORDER — NAPROXEN 500 MG/1
500 TABLET ORAL 2 TIMES DAILY WITH MEALS
Qty: 60 TABLET | Refills: 0 | Status: SHIPPED | OUTPATIENT
Start: 2022-11-11

## 2022-11-11 ASSESSMENT — ENCOUNTER SYMPTOMS
SHORTNESS OF BREATH: 0
NAUSEA: 0
BACK PAIN: 0
EYE PAIN: 0
CHEST TIGHTNESS: 0
SORE THROAT: 0
DIARRHEA: 0

## 2022-11-11 NOTE — ED TRIAGE NOTES
Patient reports ongoing chest pain that comes and goes, he attributes to recurring pericarditis.  Worse with bending over, worse with inspiration

## 2022-11-11 NOTE — ED PROVIDER NOTES
[address] [facility] ED  eMERGENCYdEPARTMENT eNCOUnter      Pt Name: Doreen Aden  MRN: [id number]  [place] 1950  Date of evaluation: 11/11/2022  Provider:Tom Daugherty PA-C    CHIEF COMPLAINT           HPI  Doreen Aden is a 67 y.o. male per chart review has a h/o hypertension, hyperlipidemia, A. fib presenting with chest pain. Patient reports gradual onset, worsening, severe, sharp, worse with deep inspiration and worse while bending forward does been ongoing for the past few days. Patient states he has a history of pericarditis from an unknown source that he thinks this is another bout of. He states he normally takes colchicine when he gets flareups but he has been taking it for the past couple days and it does not seem to be helping. He presents mostly because he wants to make sure he is not having an acute heart attack. He denies shortness of breath, exertional chest pain, fever, chills. ROS  Review of Systems   Constitutional:  Negative for chills, fatigue and fever. HENT:  Negative for ear pain, hearing loss and sore throat. Eyes:  Negative for pain and visual disturbance. Respiratory:  Negative for chest tightness and shortness of breath. Cardiovascular:  Positive for chest pain. Gastrointestinal:  Negative for diarrhea and nausea. Endocrine: Negative for cold intolerance. Genitourinary:  Negative for hematuria. Musculoskeletal:  Negative for back pain. Skin:  Negative for rash and wound. Neurological:  Negative for dizziness and headaches. Psychiatric/Behavioral:  Negative for behavioral problems and confusion. Except as noted above the remainder of the review of systems was reviewed and negative.        PAST MEDICAL HISTORY     Past Medical History:   Diagnosis Date    Atrial fibrillation ([facility] [address] 5/2/2017    Dysthymia     Hyperlipidemia     Hypertension     Plantar fasciitis     RT          SURGICAL HISTORY       Past Surgical History:   Procedure Laterality Date    CATARACT REMOVAL WITH IMPLANT Bilateral 01/2016    TONSILLECTOMY           CURRENTMEDICATIONS       Previous Medications    APIXABAN (ELIQUIS) 5 MG TABS TABLET    Take 5 mg by mouth 2 times daily    APIXABAN (ELIQUIS) 5 MG TABS TABLET    Take 5 mg by mouth in the morning and at bedtime    CLONAZEPAM (KLONOPIN) 0.5 MG TABLET    Take 1 tablet by mouth nightly as needed for Anxiety    LISINOPRIL (PRINIVIL;ZESTRIL) 10 MG TABLET    0.5 tab PO a day    LOSARTAN (COZAAR) 25 MG TABLET    Take 25 mg by mouth daily    METOPROLOL SUCCINATE (TOPROL XL) 25 MG EXTENDED RELEASE TABLET    12.5 mg nightly    PRAVASTATIN (PRAVACHOL) 10 MG TABLET    Take 5 mg by mouth daily Takes 5mg and then every 3rd day takes 10mg    SIMVASTATIN (ZOCOR) 10 MG TABLET    Take 1 tablet by mouth daily       ALLERGIES     Patient has no known allergies. FAMILY HISTORY       Family History   Problem Relation Age of Onset    Heart Disease Mother     Cancer Mother         breast/bone mets    Alzheimer's Disease Father     Dementia Father           SOCIAL HISTORY       Social History     Socioeconomic History    Marital status:      Spouse name: None    Number of children: None    Years of education: None    Highest education level: None   Tobacco Use    Smoking status: Never    Smokeless tobacco: Never   Vaping Use    Vaping Use: Never used   Substance and Sexual Activity    Alcohol use: Yes     Comment: rarely     Drug use: Not Currently    Sexual activity: Yes     Partners: Female         PHYSICAL EXAM       ED Triage Vitals [11/11/22 0112]   BP Temp Temp src Heart Rate Resp SpO2 Height Weight   (!) 159/90 -- -- 71 20 100 % 6' 1\" (1.854 m) 186 lb (84.4 kg)       Physical Exam  Constitutional:       Appearance: Normal appearance. HENT:      Head: Normocephalic and atraumatic.       Nose: Nose normal.      Mouth/Throat:      Mouth: Mucous membranes are moist.      Pharynx: No oropharyngeal exudate or posterior oropharyngeal erythema. Eyes:      Extraocular Movements: Extraocular movements intact. Conjunctiva/sclera: Conjunctivae normal.      Pupils: Pupils are equal, round, and reactive to light. Cardiovascular:      Rate and Rhythm: Normal rate and regular rhythm. Heart sounds: Normal heart sounds. Pulmonary:      Effort: Pulmonary effort is normal.      Breath sounds: Normal breath sounds. No wheezing or rhonchi. Chest:       Abdominal:      General: Bowel sounds are normal.      Palpations: Abdomen is soft. Tenderness: There is no abdominal tenderness. There is no guarding. Musculoskeletal:         General: No deformity. Normal range of motion. Cervical back: Normal range of motion and neck supple. Skin:     General: Skin is warm and dry. Coloration: Skin is not jaundiced. Neurological:      General: No focal deficit present. Mental Status: He is alert and oriented to person, place, and time. Psychiatric:         Mood and Affect: Mood normal.         Behavior: Behavior normal.         MDM  This is a 77-year-old male presenting with chest pain. Patient is afebrile and hemodynamically stable. EKG shows NSR with HR 67, normal axis, normal intervals, no ST changes. CRP and ESR mildly elevated. Respiratory panel pending. Troponin negative. Patient's chest pain more consistent with a pericardial lining irritation rather than coronary artery blockage pain. Patient already has follow-up appointment scheduled for early this week. He will return if symptoms change or worsen. FINAL IMPRESSION      1.  Atypical chest pain          DISPOSITION/PLAN   DISPOSITION Decision To Discharge 11/11/2022 02:35:53 AM        DISCHARGE MEDICATIONS:  [unfilled]         Camron Trevizo PA-C(electronically signed)  Attending Emergency Physician           Camron Trevizo PA-C  11/11/22 [facility] [id number]  11/11/22 0413

## 2023-01-17 LAB
ANION GAP SERPL CALCULATED.3IONS-SCNC: 10 MMOL/L (ref 10–20)
BICARBONATE: 29 MMOL/L (ref 21–32)
CALCIUM SERPL-MCNC: 9.7 MG/DL (ref 8.6–10.3)
CHLORIDE BLD-SCNC: 105 MMOL/L (ref 98–107)
CREAT SERPL-MCNC: 0.91 MG/DL (ref 0.5–1.3)
EGFR MALE: 89 ML/MIN/1.73M2
ERYTHROCYTE [DISTWIDTH] IN BLOOD BY AUTOMATED COUNT: 14.1 % (ref 11.5–14)
GLUCOSE: 90 MG/DL (ref 74–99)
HCT VFR BLD CALC: 42.6 % (ref 41–52)
HEMOGLOBIN: 13.8 G/DL (ref 13.5–17)
MCHC RBC AUTO-ENTMCNC: 32.4 G/DL (ref 32–36)
MCV RBC AUTO: 93 FL (ref 80–100)
PLATELET # BLD: 332 X10E9/L (ref 150–450)
POTASSIUM SERPL-SCNC: 5.2 MMOL/L (ref 3.5–5.3)
RBC # BLD: 4.58 X10E12/L (ref 4.5–5.9)
SODIUM BLD-SCNC: 139 MMOL/L (ref 136–145)
UREA NITROGEN: 23 MG/DL (ref 6–23)
WBC: 3.5 X10E9/L (ref 4.4–11.3)

## 2023-03-03 LAB
ANION GAP IN SER/PLAS: 14 MMOL/L (ref 10–20)
C REACTIVE PROTEIN (MG/L) IN SER/PLAS BY HIGH SENSIT: 0.5 MG/L
CALCIUM (MG/DL) IN SER/PLAS: 9.4 MG/DL (ref 8.6–10.3)
CARBON DIOXIDE, TOTAL (MMOL/L) IN SER/PLAS: 26 MMOL/L (ref 21–32)
CHLORIDE (MMOL/L) IN SER/PLAS: 104 MMOL/L (ref 98–107)
CREATININE (MG/DL) IN SER/PLAS: 0.88 MG/DL (ref 0.5–1.3)
ERYTHROCYTE DISTRIBUTION WIDTH (RATIO) BY AUTOMATED COUNT: 15.4 % (ref 11.5–14.5)
ERYTHROCYTE MEAN CORPUSCULAR HEMOGLOBIN CONCENTRATION (G/DL) BY AUTOMATED: 32.7 G/DL (ref 32–36)
ERYTHROCYTE MEAN CORPUSCULAR VOLUME (FL) BY AUTOMATED COUNT: 92 FL (ref 80–100)
ERYTHROCYTES (10*6/UL) IN BLOOD BY AUTOMATED COUNT: 5.15 X10E12/L (ref 4.5–5.9)
GFR MALE: 90 ML/MIN/1.73M2
GLUCOSE (MG/DL) IN SER/PLAS: 89 MG/DL (ref 74–99)
HEMATOCRIT (%) IN BLOOD BY AUTOMATED COUNT: 47.4 % (ref 41–52)
HEMOGLOBIN (G/DL) IN BLOOD: 15.5 G/DL (ref 13.5–17.5)
LEUKOCYTES (10*3/UL) IN BLOOD BY AUTOMATED COUNT: 4.1 X10E9/L (ref 4.4–11.3)
PLATELETS (10*3/UL) IN BLOOD AUTOMATED COUNT: 266 X10E9/L (ref 150–450)
POTASSIUM (MMOL/L) IN SER/PLAS: 4.1 MMOL/L (ref 3.5–5.3)
PROSTATE SPECIFIC ANTIGEN,SCREEN: 1.36 NG/ML (ref 0–4)
SEDIMENTATION RATE, ERYTHROCYTE: 23 MM/H (ref 0–20)
SODIUM (MMOL/L) IN SER/PLAS: 140 MMOL/L (ref 136–145)
UREA NITROGEN (MG/DL) IN SER/PLAS: 19 MG/DL (ref 6–23)

## 2023-04-19 LAB
C REACTIVE PROTEIN (MG/L) IN SER/PLAS BY HIGH SENSIT: 0.4 MG/L
C-REACTIVE PROTEIN, HIGH SENSITIVITY: 0.4 MG/L
ERYTHROCYTE DISTRIBUTION WIDTH (RATIO) BY AUTOMATED COUNT: 15.6 % (ref 11.5–14.5)
ERYTHROCYTE MEAN CORPUSCULAR HEMOGLOBIN CONCENTRATION (G/DL) BY AUTOMATED: 33.2 G/DL (ref 32–36)
ERYTHROCYTE MEAN CORPUSCULAR VOLUME (FL) BY AUTOMATED COUNT: 92 FL (ref 80–100)
ERYTHROCYTE [DISTWIDTH] IN BLOOD BY AUTOMATED COUNT: 15.6 % (ref 11.5–14)
ERYTHROCYTES (10*6/UL) IN BLOOD BY AUTOMATED COUNT: 5.23 X10E12/L (ref 4.5–5.9)
HCT VFR BLD CALC: 47.9 % (ref 41–52)
HEMATOCRIT (%) IN BLOOD BY AUTOMATED COUNT: 47.9 % (ref 41–52)
HEMOGLOBIN (G/DL) IN BLOOD: 15.9 G/DL (ref 13.5–17.5)
HEMOGLOBIN: 15.9 G/DL (ref 13.5–17)
LEUKOCYTES (10*3/UL) IN BLOOD BY AUTOMATED COUNT: 4.5 X10E9/L (ref 4.4–11.3)
MCHC RBC AUTO-ENTMCNC: 33.2 G/DL (ref 32–36)
MCV RBC AUTO: 92 FL (ref 80–100)
PLATELET # BLD: 287 X10E9/L (ref 150–450)
PLATELETS (10*3/UL) IN BLOOD AUTOMATED COUNT: 287 X10E9/L (ref 150–450)
RBC # BLD: 5.23 X10E12/L (ref 4.5–5.9)
WBC: 4.5 X10E9/L (ref 4.4–11.3)

## 2023-06-09 ENCOUNTER — OFFICE VISIT (OUTPATIENT)
Dept: PRIMARY CARE | Facility: CLINIC | Age: 73
End: 2023-06-09
Payer: MEDICARE

## 2023-06-09 VITALS
WEIGHT: 193 LBS | DIASTOLIC BLOOD PRESSURE: 68 MMHG | HEIGHT: 73 IN | SYSTOLIC BLOOD PRESSURE: 118 MMHG | BODY MASS INDEX: 25.58 KG/M2 | HEART RATE: 62 BPM | TEMPERATURE: 96.6 F

## 2023-06-09 DIAGNOSIS — I10 ESSENTIAL HYPERTENSION: ICD-10-CM

## 2023-06-09 DIAGNOSIS — I48.0 PAROXYSMAL ATRIAL FIBRILLATION (MULTI): ICD-10-CM

## 2023-06-09 DIAGNOSIS — F33.1 MODERATE EPISODE OF RECURRENT MAJOR DEPRESSIVE DISORDER (MULTI): Primary | ICD-10-CM

## 2023-06-09 DIAGNOSIS — E78.2 MIXED HYPERLIPIDEMIA: ICD-10-CM

## 2023-06-09 PROBLEM — I47.10 PAROXYSMAL SVT (SUPRAVENTRICULAR TACHYCARDIA) (CMS-HCC): Status: ACTIVE | Noted: 2023-06-09

## 2023-06-09 PROBLEM — R91.1 PULMONARY NODULE, RIGHT: Status: ACTIVE | Noted: 2023-06-09

## 2023-06-09 PROBLEM — I31.9 PERICARDITIS (HHS-HCC): Status: ACTIVE | Noted: 2023-06-09

## 2023-06-09 PROBLEM — E78.5 HYPERLIPIDEMIA: Status: ACTIVE | Noted: 2023-06-09

## 2023-06-09 PROBLEM — E55.9 VITAMIN D DEFICIENCY: Status: ACTIVE | Noted: 2023-06-09

## 2023-06-09 PROCEDURE — 1159F MED LIST DOCD IN RCRD: CPT | Performed by: INTERNAL MEDICINE

## 2023-06-09 PROCEDURE — 99213 OFFICE O/P EST LOW 20 MIN: CPT | Performed by: INTERNAL MEDICINE

## 2023-06-09 PROCEDURE — 1036F TOBACCO NON-USER: CPT | Performed by: INTERNAL MEDICINE

## 2023-06-09 PROCEDURE — 3078F DIAST BP <80 MM HG: CPT | Performed by: INTERNAL MEDICINE

## 2023-06-09 PROCEDURE — 3074F SYST BP LT 130 MM HG: CPT | Performed by: INTERNAL MEDICINE

## 2023-06-09 PROCEDURE — 1160F RVW MEDS BY RX/DR IN RCRD: CPT | Performed by: INTERNAL MEDICINE

## 2023-06-09 RX ORDER — VIT C/E/ZN/COPPR/LUTEIN/ZEAXAN 250MG-90MG
25 CAPSULE ORAL DAILY PRN
COMMUNITY

## 2023-06-09 RX ORDER — APIXABAN 5 MG/1
5 TABLET, FILM COATED ORAL 2 TIMES DAILY PRN
COMMUNITY
Start: 2019-01-04

## 2023-06-09 RX ORDER — EPINEPHRINE 0.22MG
200 AEROSOL WITH ADAPTER (ML) INHALATION EVERY OTHER DAY
COMMUNITY

## 2023-06-09 RX ORDER — LOSARTAN POTASSIUM 25 MG/1
TABLET ORAL
COMMUNITY
Start: 2019-01-03 | End: 2023-11-09 | Stop reason: SDUPTHER

## 2023-06-09 RX ORDER — PRAVASTATIN SODIUM 10 MG/1
TABLET ORAL
COMMUNITY
End: 2023-08-21 | Stop reason: SDUPTHER

## 2023-06-09 RX ORDER — PANTOPRAZOLE SODIUM 40 MG/1
1 TABLET, DELAYED RELEASE ORAL DAILY
COMMUNITY
Start: 2022-12-05 | End: 2023-08-21 | Stop reason: ALTCHOICE

## 2023-06-09 ASSESSMENT — ENCOUNTER SYMPTOMS
OCCASIONAL FEELINGS OF UNSTEADINESS: 0
CONSTITUTIONAL NEGATIVE: 1
CARDIOVASCULAR NEGATIVE: 1
NEUROLOGICAL NEGATIVE: 1
GASTROINTESTINAL NEGATIVE: 1
LOSS OF SENSATION IN FEET: 0
RESPIRATORY NEGATIVE: 1
MUSCULOSKELETAL NEGATIVE: 1
DEPRESSION: 0
EYES NEGATIVE: 1
BLURRED VISION: 0
HYPERTENSION: 1

## 2023-06-09 NOTE — PROGRESS NOTES
"Subjective   Patient ID: Vidal Waite is a 73 y.o. male who presents for Follow-up (4 mo fu).    Hypertension  This is a chronic problem. The current episode started more than 1 year ago. The problem has been resolved since onset. The problem is controlled. Pertinent negatives include no anxiety or blurred vision. Risk factors for coronary artery disease include male gender. Past treatments include angiotensin blockers. The current treatment provides significant improvement. There are no compliance problems.  There is no history of angina.        Review of Systems   Constitutional: Negative.    HENT: Negative.     Eyes: Negative.  Negative for blurred vision.   Respiratory: Negative.     Cardiovascular: Negative.    Gastrointestinal: Negative.    Musculoskeletal: Negative.    Skin: Negative.    Neurological: Negative.        Objective   /68 (BP Location: Right arm, Patient Position: Sitting, BP Cuff Size: Adult)   Pulse 62   Temp 35.9 °C (96.6 °F) (Temporal)   Ht 1.854 m (6' 1\")   Wt 87.5 kg (193 lb)   BMI 25.46 kg/m²     Physical Exam  Vitals reviewed.   Constitutional:       Appearance: Normal appearance. He is normal weight.   HENT:      Head: Normocephalic and atraumatic.   Eyes:      Conjunctiva/sclera: Conjunctivae normal.   Cardiovascular:      Rate and Rhythm: Normal rate and regular rhythm.      Pulses: Normal pulses.   Pulmonary:      Effort: Pulmonary effort is normal.      Breath sounds: Normal breath sounds.   Abdominal:      Palpations: Abdomen is soft.   Musculoskeletal:         General: Normal range of motion.      Cervical back: Normal range of motion.   Skin:     General: Skin is warm and dry.   Neurological:      General: No focal deficit present.   Psychiatric:         Mood and Affect: Mood normal.       Assessment/Plan   Problem List Items Addressed This Visit          Circulatory    Paroxysmal atrial fibrillation (CMS/HCC)    Essential hypertension       Other    Moderate " episode of recurrent major depressive disorder (CMS/HCC) - Primary   Cardiology evaluation was reviewed, colchicine has been stopped, recurrent spells of pericarditis will require biological treatment interleukin-1 inhibitor.  Has not have any atrial fibrillation, he is not going to go back on Eliquis, cardiology is aware of it, he is not taking any antidepressants, he is feeling much better, pulmonary nodule is unchanged, no further CT scan will be done, they could not find any pathology in the right lower quadrant, pain has been intermittent in the beginning and now subsided, medications are reviewed, he remains on losartan, he remains on statins, lipid profile next time, follow-up in 4 months.

## 2023-06-15 LAB — FECAL OCCULT BLD IMMUNOASSAY: NEGATIVE

## 2023-08-09 ENCOUNTER — APPOINTMENT (OUTPATIENT)
Dept: GENERAL RADIOLOGY | Age: 73
End: 2023-08-09
Payer: MEDICARE

## 2023-08-09 ENCOUNTER — HOSPITAL ENCOUNTER (EMERGENCY)
Age: 73
Discharge: HOME | End: 2023-08-09
Payer: MEDICARE

## 2023-08-09 VITALS
BODY MASS INDEX: 24.92 KG/M2 | HEIGHT: 73 IN | DIASTOLIC BLOOD PRESSURE: 88 MMHG | SYSTOLIC BLOOD PRESSURE: 125 MMHG | TEMPERATURE: 97.7 F | WEIGHT: 188 LBS | RESPIRATION RATE: 20 BRPM | HEART RATE: 60 BPM | OXYGEN SATURATION: 100 %

## 2023-08-09 DIAGNOSIS — S91.012A LACERATION OF LEFT ANKLE, INITIAL ENCOUNTER: Primary | ICD-10-CM

## 2023-08-09 DIAGNOSIS — S96.909A INJURY OF TENDON OF ANKLE: ICD-10-CM

## 2023-08-09 LAB
ALBUMIN SERPL-MCNC: 4.3 G/DL (ref 3.5–4.6)
ALP SERPL-CCNC: 45 U/L (ref 35–104)
ALT SERPL-CCNC: 8 U/L (ref 0–41)
ANION GAP SERPL CALCULATED.3IONS-SCNC: 12 MEQ/L (ref 9–15)
APTT PPP: 27.8 SEC (ref 24.4–36.8)
AST SERPL-CCNC: 16 U/L (ref 0–40)
BASOPHILS # BLD: 0 K/UL (ref 0–0.2)
BASOPHILS NFR BLD: 0.7 %
BILIRUB SERPL-MCNC: 0.4 MG/DL (ref 0.2–0.7)
BUN SERPL-MCNC: 19 MG/DL (ref 8–23)
CALCIUM SERPL-MCNC: 9.4 MG/DL (ref 8.5–9.9)
CHLORIDE SERPL-SCNC: 106 MEQ/L (ref 95–107)
CO2 SERPL-SCNC: 21 MEQ/L (ref 20–31)
CREAT SERPL-MCNC: 0.96 MG/DL (ref 0.7–1.2)
EOSINOPHIL # BLD: 0.1 K/UL (ref 0–0.7)
EOSINOPHIL NFR BLD: 1.4 %
ERYTHROCYTE [DISTWIDTH] IN BLOOD BY AUTOMATED COUNT: 14.6 % (ref 11.5–14.5)
GLOBULIN SER CALC-MCNC: 3.1 G/DL (ref 2.3–3.5)
GLUCOSE SERPL-MCNC: 87 MG/DL (ref 70–99)
HCT VFR BLD AUTO: 43.5 % (ref 42–52)
HGB BLD-MCNC: 14.6 G/DL (ref 14–18)
INR PPP: 1.1
LYMPHOCYTES # BLD: 2 K/UL (ref 1–4.8)
LYMPHOCYTES NFR BLD: 34.8 %
MCH RBC QN AUTO: 31.7 PG (ref 27–31.3)
MCHC RBC AUTO-ENTMCNC: 33.6 % (ref 33–37)
MCV RBC AUTO: 94.2 FL (ref 79–92.2)
MONOCYTES # BLD: 0.4 K/UL (ref 0.2–0.8)
MONOCYTES NFR BLD: 7.4 %
NEUTROPHILS # BLD: 3.3 K/UL (ref 1.4–6.5)
NEUTS SEG NFR BLD: 55.7 %
PLATELET # BLD AUTO: 284 K/UL (ref 130–400)
POTASSIUM SERPL-SCNC: 4 MEQ/L (ref 3.4–4.9)
PROT SERPL-MCNC: 7.4 G/DL (ref 6.3–8)
PROTHROMBIN TIME: 14.1 SEC (ref 12.3–14.9)
RBC # BLD AUTO: 4.62 M/UL (ref 4.7–6.1)
SODIUM SERPL-SCNC: 139 MEQ/L (ref 135–144)
WBC # BLD AUTO: 5.9 K/UL (ref 4.8–10.8)

## 2023-08-09 PROCEDURE — 36415 COLL VENOUS BLD VENIPUNCTURE: CPT

## 2023-08-09 PROCEDURE — 2500000003 HC RX 250 WO HCPCS: Performed by: PHYSICIAN ASSISTANT

## 2023-08-09 PROCEDURE — 85730 THROMBOPLASTIN TIME PARTIAL: CPT

## 2023-08-09 PROCEDURE — 73610 X-RAY EXAM OF ANKLE: CPT

## 2023-08-09 PROCEDURE — 2580000003 HC RX 258

## 2023-08-09 PROCEDURE — 99285 EMERGENCY DEPT VISIT HI MDM: CPT

## 2023-08-09 PROCEDURE — 96374 THER/PROPH/DIAG INJ IV PUSH: CPT

## 2023-08-09 PROCEDURE — 80053 COMPREHEN METABOLIC PANEL: CPT

## 2023-08-09 PROCEDURE — 99283 EMERGENCY DEPT VISIT LOW MDM: CPT | Performed by: PHYSICIAN ASSISTANT

## 2023-08-09 PROCEDURE — 73630 X-RAY EXAM OF FOOT: CPT

## 2023-08-09 PROCEDURE — 90715 TDAP VACCINE 7 YRS/> IM: CPT | Performed by: PHYSICIAN ASSISTANT

## 2023-08-09 PROCEDURE — 96361 HYDRATE IV INFUSION ADD-ON: CPT

## 2023-08-09 PROCEDURE — 2580000003 HC RX 258: Performed by: PHYSICIAN ASSISTANT

## 2023-08-09 PROCEDURE — 6360000002 HC RX W HCPCS: Performed by: PHYSICIAN ASSISTANT

## 2023-08-09 PROCEDURE — 96375 TX/PRO/DX INJ NEW DRUG ADDON: CPT

## 2023-08-09 PROCEDURE — 90471 IMMUNIZATION ADMIN: CPT | Performed by: PHYSICIAN ASSISTANT

## 2023-08-09 PROCEDURE — 96372 THER/PROPH/DIAG INJ SC/IM: CPT

## 2023-08-09 PROCEDURE — 85610 PROTHROMBIN TIME: CPT

## 2023-08-09 PROCEDURE — 85025 COMPLETE CBC W/AUTO DIFF WBC: CPT

## 2023-08-09 RX ORDER — SULFAMETHOXAZOLE AND TRIMETHOPRIM 800; 160 MG/1; MG/1
1 TABLET ORAL 2 TIMES DAILY
Qty: 14 TABLET | Refills: 0 | Status: SHIPPED | OUTPATIENT
Start: 2023-08-09 | End: 2023-08-16

## 2023-08-09 RX ORDER — 0.9 % SODIUM CHLORIDE 0.9 %
1000 INTRAVENOUS SOLUTION INTRAVENOUS ONCE
Status: COMPLETED | OUTPATIENT
Start: 2023-08-09 | End: 2023-08-09

## 2023-08-09 RX ORDER — LIDOCAINE HYDROCHLORIDE AND EPINEPHRINE 10; 10 MG/ML; UG/ML
20 INJECTION, SOLUTION INFILTRATION; PERINEURAL ONCE
Status: COMPLETED | OUTPATIENT
Start: 2023-08-09 | End: 2023-08-09

## 2023-08-09 RX ORDER — SODIUM CHLORIDE 9 MG/ML
INJECTION, SOLUTION INTRAVENOUS
Status: COMPLETED
Start: 2023-08-09 | End: 2023-08-09

## 2023-08-09 RX ORDER — LIDOCAINE HYDROCHLORIDE AND EPINEPHRINE 10; 10 MG/ML; UG/ML
INJECTION, SOLUTION INFILTRATION; PERINEURAL
Status: DISCONTINUED
Start: 2023-08-09 | End: 2023-08-09 | Stop reason: HOSPADM

## 2023-08-09 RX ORDER — FENTANYL CITRATE 50 UG/ML
25 INJECTION, SOLUTION INTRAMUSCULAR; INTRAVENOUS ONCE
Status: COMPLETED | OUTPATIENT
Start: 2023-08-09 | End: 2023-08-09

## 2023-08-09 RX ADMIN — SODIUM CHLORIDE 1000 ML: 9 INJECTION, SOLUTION INTRAVENOUS at 16:20

## 2023-08-09 RX ADMIN — LIDOCAINE HYDROCHLORIDE,EPINEPHRINE BITARTRATE 20 ML: 10; .01 INJECTION, SOLUTION INFILTRATION; PERINEURAL at 16:22

## 2023-08-09 RX ADMIN — Medication 1000 ML: at 16:20

## 2023-08-09 RX ADMIN — TETANUS TOXOID, REDUCED DIPHTHERIA TOXOID AND ACELLULAR PERTUSSIS VACCINE, ADSORBED 0.5 ML: 5; 2.5; 8; 8; 2.5 SUSPENSION INTRAMUSCULAR at 17:08

## 2023-08-09 RX ADMIN — FENTANYL CITRATE 25 MCG: 50 INJECTION, SOLUTION INTRAMUSCULAR; INTRAVENOUS at 16:23

## 2023-08-09 RX ADMIN — CEFAZOLIN 2000 MG: 2 INJECTION, POWDER, FOR SOLUTION INTRAMUSCULAR; INTRAVENOUS at 17:14

## 2023-08-09 ASSESSMENT — ENCOUNTER SYMPTOMS
SHORTNESS OF BREATH: 0
RHINORRHEA: 0
SORE THROAT: 0
COLOR CHANGE: 0
ABDOMINAL PAIN: 0
CONSTIPATION: 0
ABDOMINAL DISTENTION: 0
EYE DISCHARGE: 0

## 2023-08-09 ASSESSMENT — PAIN DESCRIPTION - LOCATION: LOCATION: ANKLE

## 2023-08-09 ASSESSMENT — PAIN - FUNCTIONAL ASSESSMENT: PAIN_FUNCTIONAL_ASSESSMENT: 0-10

## 2023-08-09 ASSESSMENT — LIFESTYLE VARIABLES
HOW OFTEN DO YOU HAVE A DRINK CONTAINING ALCOHOL: NEVER
HOW MANY STANDARD DRINKS CONTAINING ALCOHOL DO YOU HAVE ON A TYPICAL DAY: PATIENT DOES NOT DRINK
HOW MANY STANDARD DRINKS CONTAINING ALCOHOL DO YOU HAVE ON A TYPICAL DAY: PATIENT DOES NOT DRINK
HOW OFTEN DO YOU HAVE A DRINK CONTAINING ALCOHOL: NEVER

## 2023-08-09 ASSESSMENT — PAIN DESCRIPTION - PAIN TYPE: TYPE: ACUTE PAIN

## 2023-08-09 ASSESSMENT — PAIN SCALES - GENERAL: PAINLEVEL_OUTOF10: 7

## 2023-08-09 ASSESSMENT — PAIN DESCRIPTION - FREQUENCY: FREQUENCY: CONTINUOUS

## 2023-08-09 ASSESSMENT — PAIN DESCRIPTION - ORIENTATION: ORIENTATION: INNER

## 2023-08-09 NOTE — CONSULTS
Trauma Consult / H & P Note    Reason for Consult: Trauma  Consulting Provider: Mark Gomez PA-C      BASIC INJURY INFORMATION:  Level of activation: CAT 2  Mode of transport: Personal vehicle  Mechanism of injury: Struck by tree branch  Complicating features: NA  Protective measures: NA    HISTORY OF PRESENT INJURY:   Kaycee Wen is a 75 y.o. male with a PMHx of A-fib (on Eliquis), HTN and HLD presents to [facility] ED for evaluation today after sustaining a laceration to his left ankle. Reports that he was cutting branches with a chainsaw when the tree branch became stuck and kicked backed into his left ankle. He is unsure if the laceration was caused by the chainsaw or the tree branch. Reports he was was never knocked backwards onto the ground and was able to ambulate back to the house for help. Reports he is on Eliquis for A-fib. Denies numbness or loss of motor function to left ankle or toes. No cold limb. Hemostatic on exam.      PRIMARY SURVEY:  Airway: Intact  Breathing: Normal   Breath Sounds: Breath Sounds Equal Bilaterally  Circulation:    Pulses: Normal   Skin: Laceration to the medial left ankle with exposure of tendon. There does not appear to injury to the tendon itself. Disability:   Pupils: PERRL   GCS:    Best Eyes: 4    Best Verbal: 5    Best Motor: 6    Total: 15    Vitals:   Vitals:    08/09/23 1622 08/09/23 1623 08/09/23 1625 08/09/23 1627   BP:   125/88    Pulse:       Resp:       Temp:       TempSrc:       SpO2: 100% 100%  100%   Weight:       Height:           SECONDARY SURVEY:  Neurologic: Alert and Oriented, Appropriate, Moves all Extremities, Strength Symmetrical, and No Sensory Deficits   HEENT:   Head: No lacerations, bony step-offs, or abrasions and Midface stable to palpation   Eyes: PERRL, Corneas/Conjunctiva without lesions and EOM intact   Ears: Not Examined   Nose: Septum Midline, No crepitus with motion; and No bloody discharge;    Throat: Oral cavity without

## 2023-08-09 NOTE — PROCEDURES
staples/sutures will need to come in 10-14 days at PCP or Trauma Clinic.     Xu Brody PA-C  Trauma/Critical Care/General Surgery

## 2023-08-09 NOTE — ED PROVIDER NOTES
[facility] ED  eMERGENCY dEPARTMENT eNCOUnter      Pt Name: Javad Dugan  MRN: [id number]  [address] [facility] 1950  Date of evaluation: 8/9/2023  Provider: Linette Nyhan, PA-C    CHIEF COMPLAINT       Chief Complaint   Patient presents with    Laceration     Laceration to the medial left ankle         HISTORY OF PRESENT ILLNESS   (Location/Symptom, Timing/Onset,Context/Setting, Quality, Duration, Modifying Factors, Severity)  Note limiting factors. Javad Dugan is a 75 y.o. male who presents to the emergency department with complaint of laceration to left lower extremity ankle. Patient states he was cutting branches with his chainsaw, they were on the ground, he was traveling over them, he believes the branch was bound by the chainsaw, kicked back and hit him in the ankle, he is not sure whether the extra branch, or the chain caused the laceration. Patient denies any other injuries no head neck or back pain, no loss conscious, no chest pain or shortness of breath, patient has good sensation, and has been able to bear weight since the incident occurred. Past medical history per patient hypertension, hyperlipidemia, atrial fibrillation. Patient on anticoagulation Eliquis. Rates his current pain at this time is a 4 out of 10. HPI    NursingNotes were reviewed. REVIEW OF SYSTEMS    (2-9 systems for level 4, 10 or more for level 5)     Review of Systems   Constitutional:  Negative for activity change and appetite change. HENT:  Negative for congestion, ear discharge, ear pain, nosebleeds, rhinorrhea and sore throat. Eyes:  Negative for discharge. Respiratory:  Negative for shortness of breath. Cardiovascular:  Negative for chest pain, palpitations and leg swelling. Gastrointestinal:  Negative for abdominal distention, abdominal pain and constipation. Genitourinary:  Negative for difficulty urinating and dysuria. Musculoskeletal:  Negative for arthralgias.         Left

## 2023-08-21 ENCOUNTER — OFFICE VISIT (OUTPATIENT)
Dept: PRIMARY CARE | Facility: CLINIC | Age: 73
End: 2023-08-21
Payer: MEDICARE

## 2023-08-21 VITALS
HEIGHT: 73 IN | BODY MASS INDEX: 25.05 KG/M2 | TEMPERATURE: 96.8 F | SYSTOLIC BLOOD PRESSURE: 112 MMHG | HEART RATE: 66 BPM | DIASTOLIC BLOOD PRESSURE: 72 MMHG | WEIGHT: 189 LBS

## 2023-08-21 DIAGNOSIS — E78.2 MIXED HYPERLIPIDEMIA: ICD-10-CM

## 2023-08-21 DIAGNOSIS — S91.012D LACERATION OF LEFT ANKLE, SUBSEQUENT ENCOUNTER: Primary | ICD-10-CM

## 2023-08-21 PROCEDURE — 1036F TOBACCO NON-USER: CPT | Performed by: INTERNAL MEDICINE

## 2023-08-21 PROCEDURE — 1159F MED LIST DOCD IN RCRD: CPT | Performed by: INTERNAL MEDICINE

## 2023-08-21 PROCEDURE — 3074F SYST BP LT 130 MM HG: CPT | Performed by: INTERNAL MEDICINE

## 2023-08-21 PROCEDURE — 3078F DIAST BP <80 MM HG: CPT | Performed by: INTERNAL MEDICINE

## 2023-08-21 PROCEDURE — 1160F RVW MEDS BY RX/DR IN RCRD: CPT | Performed by: INTERNAL MEDICINE

## 2023-08-21 PROCEDURE — 99212 OFFICE O/P EST SF 10 MIN: CPT | Performed by: INTERNAL MEDICINE

## 2023-08-21 RX ORDER — PRAVASTATIN SODIUM 10 MG/1
TABLET ORAL
Qty: 90 TABLET | Refills: 3 | Status: SHIPPED | OUTPATIENT
Start: 2023-08-21 | End: 2024-02-23 | Stop reason: SDUPTHER

## 2023-08-21 ASSESSMENT — ENCOUNTER SYMPTOMS
ARTHRALGIAS: 0
EYES NEGATIVE: 1
GASTROINTESTINAL NEGATIVE: 1
CONSTITUTIONAL NEGATIVE: 1
WOUND: 1
RESPIRATORY NEGATIVE: 1
CARDIOVASCULAR NEGATIVE: 1

## 2023-08-21 NOTE — PROGRESS NOTES
"Subjective   Patient ID: Vidal Waite is a 73 y.o. male who presents for Follow-up (Er fu for left ankle injury and stitch removal).    Laceration   The incident occurred more than 1 week ago. The laceration is located on the Left foot. The laceration is 5 cm in size. The laceration mechanism was a blunt object. The patient is experiencing no pain. He reports no foreign bodies present. His tetanus status is UTD.        Review of Systems   Constitutional: Negative.    HENT: Negative.     Eyes: Negative.    Respiratory: Negative.     Cardiovascular: Negative.    Gastrointestinal: Negative.    Musculoskeletal:  Negative for arthralgias.   Skin:  Positive for wound.       Objective   /72 (BP Location: Left arm, Patient Position: Sitting, BP Cuff Size: Adult)   Pulse 66   Temp 36 °C (96.8 °F) (Temporal)   Ht 1.854 m (6' 1\")   Wt 85.7 kg (189 lb)   BMI 24.94 kg/m²     Physical Exam  Vitals reviewed.   Constitutional:       Appearance: Normal appearance. He is normal weight.   HENT:      Head: Normocephalic and atraumatic.   Eyes:      Conjunctiva/sclera: Conjunctivae normal.   Cardiovascular:      Rate and Rhythm: Normal rate and regular rhythm.   Pulmonary:      Effort: Pulmonary effort is normal.   Abdominal:      Palpations: Abdomen is soft.   Musculoskeletal:      Cervical back: Normal range of motion.   Skin:     General: Skin is warm and dry.      Findings: Laceration and wound present.       Assessment/Plan   Problem List Items Addressed This Visit       Hyperlipidemia    Relevant Medications    pravastatin (Pravachol) 10 mg tablet     Other Visit Diagnoses       Laceration of left ankle, subsequent encounter    -  Primary        He came here for removal of the stitches from the medial aspect of left ankle, there are mattress stitches, I could remove 1 suture but then I could not remove because I could not make a loop to cut the suture with the seizure, I tried but then decided that surgery can " remove the stitches with the better equipment so patient was referred to general surgery across the [place].  Statin was renewed.

## 2023-09-22 LAB
CHOLESTEROL (MG/DL) IN SER/PLAS: 141 MG/DL (ref 0–199)
CHOLESTEROL IN HDL (MG/DL) IN SER/PLAS: 54.4 MG/DL
CHOLESTEROL/HDL RATIO: 2.6
CHOLESTEROL/HDL RATIO: 2.6
CHOLESTEROL: 141 MG/DL (ref 0–199)
HDLC SERPL-MCNC: 54.4 MG/DL
LDL CHOLESTEROL: 73 MG/DL (ref 0–99)
LDL: 73 MG/DL (ref 0–99)
TRIGL SERPL-MCNC: 68 MG/DL (ref 0–149)
TRIGLYCERIDE (MG/DL) IN SER/PLAS: 68 MG/DL (ref 0–149)
VLDL: 14 MG/DL (ref 0–40)
VLDLC SERPL CALC-MCNC: 14 MG/DL (ref 0–40)

## 2023-10-13 ENCOUNTER — OFFICE VISIT (OUTPATIENT)
Dept: PRIMARY CARE | Facility: CLINIC | Age: 73
End: 2023-10-13
Payer: MEDICARE

## 2023-10-13 VITALS
DIASTOLIC BLOOD PRESSURE: 79 MMHG | WEIGHT: 190 LBS | BODY MASS INDEX: 25.18 KG/M2 | HEIGHT: 73 IN | TEMPERATURE: 97.8 F | SYSTOLIC BLOOD PRESSURE: 120 MMHG | HEART RATE: 59 BPM

## 2023-10-13 DIAGNOSIS — I48.0 PAROXYSMAL ATRIAL FIBRILLATION (MULTI): Primary | ICD-10-CM

## 2023-10-13 DIAGNOSIS — E78.00 PURE HYPERCHOLESTEROLEMIA: ICD-10-CM

## 2023-10-13 DIAGNOSIS — I10 ESSENTIAL HYPERTENSION: ICD-10-CM

## 2023-10-13 PROCEDURE — 99213 OFFICE O/P EST LOW 20 MIN: CPT | Performed by: INTERNAL MEDICINE

## 2023-10-13 PROCEDURE — 3074F SYST BP LT 130 MM HG: CPT | Performed by: INTERNAL MEDICINE

## 2023-10-13 PROCEDURE — 3078F DIAST BP <80 MM HG: CPT | Performed by: INTERNAL MEDICINE

## 2023-10-13 PROCEDURE — 1160F RVW MEDS BY RX/DR IN RCRD: CPT | Performed by: INTERNAL MEDICINE

## 2023-10-13 PROCEDURE — 1036F TOBACCO NON-USER: CPT | Performed by: INTERNAL MEDICINE

## 2023-10-13 PROCEDURE — 1159F MED LIST DOCD IN RCRD: CPT | Performed by: INTERNAL MEDICINE

## 2023-10-13 ASSESSMENT — ENCOUNTER SYMPTOMS
EYES NEGATIVE: 1
CONSTITUTIONAL NEGATIVE: 1
NUMBNESS: 0
MUSCULOSKELETAL NEGATIVE: 1
CARDIOVASCULAR NEGATIVE: 1
ARTHRALGIAS: 0
ABDOMINAL PAIN: 0
WEAKNESS: 0
RESPIRATORY NEGATIVE: 1
GASTROINTESTINAL NEGATIVE: 1
ANOREXIA: 0

## 2023-10-13 NOTE — PROGRESS NOTES
"Subjective   Patient ID: Vidal Waite is a 73 y.o. male who presents for Follow-up (2 mo fu).    Heart Problem  This is a chronic problem. The current episode started more than 1 year ago. The problem occurs rarely. The problem has been waxing and waning. Pertinent negatives include no abdominal pain, anorexia, arthralgias, numbness or weakness. The treatment provided significant relief.      Hypertension  This is a chronic problem. The current episode started more than 1 year ago. The problem has been resolved since onset. The problem is controlled. Pertinent negatives include no anxiety or blurred vision. Risk factors for coronary artery disease include male gender. Past treatments include angiotensin blockers. The current treatment provides significant improvement. There are no compliance problems.  There is no history of angina.      Review of Systems   Constitutional: Negative.    HENT: Negative.     Eyes: Negative.    Respiratory: Negative.     Cardiovascular: Negative.    Gastrointestinal: Negative.  Negative for abdominal pain and anorexia.   Musculoskeletal: Negative.  Negative for arthralgias.   Skin: Negative.    Neurological:  Negative for weakness and numbness.       Objective   /79 (BP Location: Left arm, Patient Position: Sitting, BP Cuff Size: Adult)   Pulse 59   Temp 36.6 °C (97.8 °F) (Temporal)   Ht 1.854 m (6' 1\")   Wt 86.2 kg (190 lb)   BMI 25.07 kg/m²     Physical Exam  Vitals reviewed.   Constitutional:       Appearance: Normal appearance. He is normal weight.   HENT:      Head: Normocephalic and atraumatic.   Eyes:      Conjunctiva/sclera: Conjunctivae normal.   Cardiovascular:      Rate and Rhythm: Normal rate and regular rhythm.      Pulses: Normal pulses.   Pulmonary:      Effort: Pulmonary effort is normal.      Breath sounds: Normal breath sounds.   Abdominal:      Palpations: Abdomen is soft.   Musculoskeletal:         General: Normal range of motion.      Cervical " back: Normal range of motion.   Skin:     General: Skin is warm and dry.   Neurological:      General: No focal deficit present.   Psychiatric:         Mood and Affect: Mood normal.       Assessment/Plan   Problem List Items Addressed This Visit             ICD-10-CM    Paroxysmal atrial fibrillation (CMS/HCC) - Primary I48.0    Essential hypertension I10    Hyperlipidemia E78.5   Patient was evaluated today, problem list was reviewed, problems and concerns addressed, Rx list reviewed and updated, lab and tests were noted and reviewed. Life style changes were discussed, always it works better if we eat plant based diet and plenty of fibres and roughage. Consume adequate amount of water and avoid alcohol, light to moderate physical activities and stress reduction are always beneficial for ongoing physical well being. Do not forget to have 6 to 7 hours of sleep regularly .  He was seen by cardiology, he does not take Eliquis every day and I told him that Eliquis has to be taken every day either take every day or do not take he prefers to take it as needed if he feels that he has atrial fibrillation.  There is no evidence of any pericarditis, his BP readings are excellent, abdominal pain has not been recurrent, recent lipid profile were reviewed, he is asymptomatic, no new orders given today, follow-up in 4 months.

## 2023-11-09 DIAGNOSIS — I10 ESSENTIAL HYPERTENSION: ICD-10-CM

## 2023-11-09 RX ORDER — LOSARTAN POTASSIUM 25 MG/1
25 TABLET ORAL
Qty: 90 TABLET | Refills: 3 | Status: SHIPPED | OUTPATIENT
Start: 2023-11-09

## 2023-12-22 PROBLEM — L57.0 ACTINIC KERATOSIS: Status: ACTIVE | Noted: 2023-12-22

## 2023-12-22 PROBLEM — I10 HYPERTENSION: Status: ACTIVE | Noted: 2023-12-22

## 2023-12-22 PROBLEM — I25.10 CORONARY ARTERY DISEASE INVOLVING NATIVE CORONARY ARTERY OF NATIVE HEART WITHOUT ANGINA PECTORIS: Status: ACTIVE | Noted: 2023-12-22

## 2023-12-22 PROBLEM — E78.2 MIXED HYPERLIPIDEMIA: Status: ACTIVE | Noted: 2023-12-22

## 2024-02-16 ENCOUNTER — LAB (OUTPATIENT)
Dept: LAB | Facility: LAB | Age: 74
End: 2024-02-16
Payer: MEDICARE

## 2024-02-16 DIAGNOSIS — E78.2 MIXED HYPERLIPIDEMIA: ICD-10-CM

## 2024-02-16 DIAGNOSIS — Z12.5 PROSTATE CANCER SCREENING: ICD-10-CM

## 2024-02-16 LAB
CHOLEST SERPL-MCNC: 133 MG/DL (ref 0–199)
CHOLESTEROL/HDL RATIO: 2.8
HDLC SERPL-MCNC: 47.7 MG/DL
LDLC SERPL CALC-MCNC: 69 MG/DL
NON HDL CHOLESTEROL: 85 MG/DL (ref 0–149)
PSA SERPL-MCNC: 1.23 NG/ML
TRIGL SERPL-MCNC: 84 MG/DL (ref 0–149)
VLDL: 17 MG/DL (ref 0–40)

## 2024-02-16 PROCEDURE — 36415 COLL VENOUS BLD VENIPUNCTURE: CPT

## 2024-02-16 PROCEDURE — 80061 LIPID PANEL: CPT

## 2024-02-16 PROCEDURE — G0103 PSA SCREENING: HCPCS

## 2024-02-23 ENCOUNTER — OFFICE VISIT (OUTPATIENT)
Dept: PRIMARY CARE | Facility: CLINIC | Age: 74
End: 2024-02-23
Payer: MEDICARE

## 2024-02-23 VITALS
HEART RATE: 57 BPM | BODY MASS INDEX: 25.84 KG/M2 | WEIGHT: 195 LBS | TEMPERATURE: 96.4 F | SYSTOLIC BLOOD PRESSURE: 110 MMHG | HEIGHT: 73 IN | DIASTOLIC BLOOD PRESSURE: 67 MMHG

## 2024-02-23 DIAGNOSIS — I48.0 PAROXYSMAL ATRIAL FIBRILLATION (MULTI): ICD-10-CM

## 2024-02-23 DIAGNOSIS — Z00.00 ROUTINE GENERAL MEDICAL EXAMINATION AT HEALTH CARE FACILITY: Primary | ICD-10-CM

## 2024-02-23 DIAGNOSIS — E78.2 MIXED HYPERLIPIDEMIA: ICD-10-CM

## 2024-02-23 PROBLEM — F33.1 MODERATE EPISODE OF RECURRENT MAJOR DEPRESSIVE DISORDER (MULTI): Status: RESOLVED | Noted: 2023-06-09 | Resolved: 2024-02-23

## 2024-02-23 PROCEDURE — 1036F TOBACCO NON-USER: CPT | Performed by: INTERNAL MEDICINE

## 2024-02-23 PROCEDURE — 3074F SYST BP LT 130 MM HG: CPT | Performed by: INTERNAL MEDICINE

## 2024-02-23 PROCEDURE — 1159F MED LIST DOCD IN RCRD: CPT | Performed by: INTERNAL MEDICINE

## 2024-02-23 PROCEDURE — 1170F FXNL STATUS ASSESSED: CPT | Performed by: INTERNAL MEDICINE

## 2024-02-23 PROCEDURE — G0439 PPPS, SUBSEQ VISIT: HCPCS | Performed by: INTERNAL MEDICINE

## 2024-02-23 PROCEDURE — 3078F DIAST BP <80 MM HG: CPT | Performed by: INTERNAL MEDICINE

## 2024-02-23 PROCEDURE — 1123F ACP DISCUSS/DSCN MKR DOCD: CPT | Performed by: INTERNAL MEDICINE

## 2024-02-23 PROCEDURE — 1160F RVW MEDS BY RX/DR IN RCRD: CPT | Performed by: INTERNAL MEDICINE

## 2024-02-23 RX ORDER — PRAVASTATIN SODIUM 10 MG/1
TABLET ORAL
Qty: 45 TABLET | Refills: 3 | Status: SHIPPED | OUTPATIENT
Start: 2024-02-23

## 2024-02-23 ASSESSMENT — PATIENT HEALTH QUESTIONNAIRE - PHQ9
1. LITTLE INTEREST OR PLEASURE IN DOING THINGS: NOT AT ALL
SUM OF ALL RESPONSES TO PHQ9 QUESTIONS 1 AND 2: 0
SUM OF ALL RESPONSES TO PHQ9 QUESTIONS 1 AND 2: 0
2. FEELING DOWN, DEPRESSED OR HOPELESS: NOT AT ALL
1. LITTLE INTEREST OR PLEASURE IN DOING THINGS: NOT AT ALL
2. FEELING DOWN, DEPRESSED OR HOPELESS: NOT AT ALL

## 2024-02-23 ASSESSMENT — ACTIVITIES OF DAILY LIVING (ADL)
JUDGMENT_ADEQUATE_SAFELY_COMPLETE_DAILY_ACTIVITIES: YES
DRESSING: INDEPENDENT
DOING_HOUSEWORK: INDEPENDENT
USING TRANSPORTATION: INDEPENDENT
BATHING: INDEPENDENT
USING TELEPHONE: INDEPENDENT
WALKS IN HOME: INDEPENDENT
GROOMING: INDEPENDENT
TOILETING: INDEPENDENT
TAKING_MEDICATION: INDEPENDENT
MANAGING_FINANCES: INDEPENDENT
PATIENT'S MEMORY ADEQUATE TO SAFELY COMPLETE DAILY ACTIVITIES?: YES
DOING HOUSEWORK: INDEPENDENT
GROCERY SHOPPING: INDEPENDENT
MANAGING FINANCES: INDEPENDENT
BATHING: INDEPENDENT
STIL DRIVING: YES
TAKING MEDICATION: INDEPENDENT
NEEDS ASSISTANCE WITH FOOD: INDEPENDENT
EATING: INDEPENDENT
PILL BOX USED: NO
DRESSING YOURSELF: INDEPENDENT
FEEDING YOURSELF: INDEPENDENT
HEARING - LEFT EAR: FUNCTIONAL
ADEQUATE_TO_COMPLETE_ADL: YES
GROCERY_SHOPPING: INDEPENDENT
PREPARING MEALS: INDEPENDENT
HEARING - RIGHT EAR: FUNCTIONAL

## 2024-02-23 ASSESSMENT — ENCOUNTER SYMPTOMS
CARDIOVASCULAR NEGATIVE: 1
GASTROINTESTINAL NEGATIVE: 1
EYES NEGATIVE: 1
LOSS OF SENSATION IN FEET: 0
MUSCULOSKELETAL NEGATIVE: 1
OCCASIONAL FEELINGS OF UNSTEADINESS: 0
CONSTITUTIONAL NEGATIVE: 1
DEPRESSION: 0
NEUROLOGICAL NEGATIVE: 1
RESPIRATORY NEGATIVE: 1

## 2024-02-23 NOTE — PROGRESS NOTES
"Subjective   Reason for Visit: Vidal Waite is an 73 y.o. male here for a Medicare Wellness visit.     Past Medical, Surgical, and Family History reviewed and updated in chart.    Reviewed all medications by prescribing practitioner or clinical pharmacist (such as prescriptions, OTCs, herbal therapies and supplements) and documented in the medical record.    73-year-old male patient was seen for wellness exam.  He has been a great shape lately, he is taking 5 mg of pravastatin 5 days a week, he does not have any episode of pericarditis, he does not have any episode of atrial fibrillation, recent PSA and LDL were reviewed, he remains physically active, there is no recent spells of atrial fibrillation or palpitations.  He has a mild diffuse coronary artery disease with the first obtuse marginal showing 50% calcified plaque, patient was explained in the past and today also what does mean by having calcified plaques.  He has no chest pain, practically speaking he has a coronary artery disease but it does not have any activity.  He lives with her spouse, he does not have any history of depression, he is not having any new concerns or issues or ER visits or chest pains.  Subpleural nodule has been checked in the past he does not require repeated surveillance.        Patient Care Team:  Leonid Ramirez MD as PCP - General  Leonid Ramirez MD as PCP - O Medicare Advantage PCP  Saji Julio MD as Consulting Physician (Cardiology)     Review of Systems   Constitutional: Negative.    HENT: Negative.     Eyes: Negative.    Respiratory: Negative.     Cardiovascular: Negative.    Gastrointestinal: Negative.    Musculoskeletal: Negative.    Neurological: Negative.        Objective   Vitals:  /67 (BP Location: Right arm, Patient Position: Sitting, BP Cuff Size: Adult)   Pulse 57   Temp 35.8 °C (96.4 °F) (Temporal)   Ht 1.854 m (6' 1\")   Wt 88.5 kg (195 lb)   BMI 25.73 kg/m²       Physical " Exam  Constitutional:       Appearance: Normal appearance. He is normal weight.   HENT:      Head: Normocephalic.      Nose: Nose normal.   Eyes:      Conjunctiva/sclera: Conjunctivae normal.   Cardiovascular:      Rate and Rhythm: Regular rhythm. Bradycardia present.      Pulses: Normal pulses.      Heart sounds: Normal heart sounds.      No friction rub.   Pulmonary:      Effort: Pulmonary effort is normal.      Breath sounds: Normal breath sounds.   Abdominal:      General: Abdomen is flat.      Palpations: Abdomen is soft.   Musculoskeletal:         General: Normal range of motion.      Cervical back: Normal range of motion and neck supple.   Skin:     General: Skin is warm and dry.   Neurological:      General: No focal deficit present.      Mental Status: He is oriented to person, place, and time. Mental status is at baseline.   Psychiatric:         Mood and Affect: Mood normal.         Judgment: Judgment normal.       Assessment/Plan   Problem List Items Addressed This Visit       Paroxysmal atrial fibrillation (CMS/HCC)     Other Visit Diagnoses       Routine general medical examination at health care facility    -  Primary        Patient has a living will, wellness exam was completed, statin will be continued, losartan, Eliquis, vitamin D supplements to be continued.  He is having slightly bradycardic heart rhythm but nothing to be concerned about.  There is no rub, there is no chest pain, there is no abdominal pain, his physical activity can be little bit more enhanced.  There is always having benefits of taking statin higher dose is always better.  There is no other issues or concerns identified on an annual preventive care visit related questions and assessment.  We will check CBC chemistry next time, call us for any issues or concerns in the interim, follow-up in 4 months.  This concludes wellness exam related preventive care visit.

## 2024-03-19 ENCOUNTER — OFFICE VISIT (OUTPATIENT)
Dept: CARDIOLOGY | Facility: CLINIC | Age: 74
End: 2024-03-19
Payer: MEDICARE

## 2024-03-19 VITALS
HEART RATE: 60 BPM | BODY MASS INDEX: 26.06 KG/M2 | WEIGHT: 196.6 LBS | DIASTOLIC BLOOD PRESSURE: 78 MMHG | HEIGHT: 73 IN | SYSTOLIC BLOOD PRESSURE: 114 MMHG

## 2024-03-19 DIAGNOSIS — E78.2 MIXED HYPERLIPIDEMIA: ICD-10-CM

## 2024-03-19 DIAGNOSIS — I25.10 CORONARY ARTERY DISEASE INVOLVING NATIVE CORONARY ARTERY OF NATIVE HEART WITHOUT ANGINA PECTORIS: ICD-10-CM

## 2024-03-19 PROCEDURE — 3078F DIAST BP <80 MM HG: CPT | Performed by: INTERNAL MEDICINE

## 2024-03-19 PROCEDURE — 1159F MED LIST DOCD IN RCRD: CPT | Performed by: INTERNAL MEDICINE

## 2024-03-19 PROCEDURE — 99213 OFFICE O/P EST LOW 20 MIN: CPT | Performed by: INTERNAL MEDICINE

## 2024-03-19 PROCEDURE — 3074F SYST BP LT 130 MM HG: CPT | Performed by: INTERNAL MEDICINE

## 2024-03-19 PROCEDURE — 1123F ACP DISCUSS/DSCN MKR DOCD: CPT | Performed by: INTERNAL MEDICINE

## 2024-03-19 PROCEDURE — 1036F TOBACCO NON-USER: CPT | Performed by: INTERNAL MEDICINE

## 2024-03-19 PROCEDURE — 1160F RVW MEDS BY RX/DR IN RCRD: CPT | Performed by: INTERNAL MEDICINE

## 2024-03-19 RX ORDER — BISMUTH SUBSALICYLATE 262 MG
1 TABLET,CHEWABLE ORAL DAILY
COMMUNITY

## 2024-03-19 NOTE — PATIENT INSTRUCTIONS
6 month follow up    Increase exercise    -Please bring all medicines, vitamins, and herbal supplements with you in original bottles to every appointment!!!!    -Prescriptions will not be filled unless you are compliant with your follow up appointments or have a follow up appointment scheduled as per instruction of your physician. Refills should be requested at the time of your visit.

## 2024-03-19 NOTE — PROGRESS NOTES
Patient:  Vidal Waite  YOB: 1950  MRN: [id number]       Impression/Plan:     Diagnoses and all orders for this visit:  Mixed hyperlipidemia  -     Rating statin well with excellent result continue same  Coronary artery disease involving native coronary artery of native heart without angina pectoris  -     No evidence to suggest progression    No recurrent pericarditis      Chief Complaint/Active Symptoms:       Vidal Waite is a 73 y.o. male who presents with  mild coronary disease by virtue of coronary CT angiography October 2020 showing trivial irregularities and a calcium score of 214. He has never had coronary events or angina. Also longstanding hypertension and paroxysmal atrial fibrillation. He developed initial onset of pericarditis in October 2020 and for the following year had about 6 recurrences. I had seen him in February2022 and he was feeling better. He had been reluctant to use colchicine on a regular basis after reading some of the adverse effects and also utilizing Aleve had help with symptoms. He done fairly well through the majority of 2022 but between July and December started having increasing episodes.     Was seen here 2/8/2023 noting that when on Aleve and colchicine he was asymptomatic. He had previously tried interrupt that combination and taper himself off the Aleve but then would have further symptoms. He had had significant increase CRP and sed rate. With the colchicine and Aleve combination the pericardial effusion had resolved. When I had seen him 2/8/2023 he had been on 6 weeks of Aleve colchicine combination and felt well. Plan at that time was to follow markers of inflammation and plan a full 6 months of therapy combined. Should pain recur would plan to refer to Dr. Meet Wright at [facility] for consideration of more advanced therapy i.e. Arcalyst (rilonacept). 6 months would be essentially the end of June 2023. He has been on Protonix secondary to  chronic Aleve use.    Last here September 2023 with pericarditis symptoms resolved.  Hypertension was controlled he had no recurrent atrial fibrillation..      He has CMP CBC ordered for the not-too-distant future.  Labs past August were stable.  Cholesterol 2/16/2024 133 HDL 48 LDL 69    He denies angina, dyspnea, palpitation, edema, lightheadedness or syncope.  He has had no symptoms of claudication or neurologic deterioration.  There have been no hospitalizations or emergency room visits since last office visit.  He has had no recurrence of pericarditis symptoms.  He said occasionally he might feel for irregular beats.  He does not describe symptoms that would suggest significant recurrence of atrial fibrillation.  He is back taking his statin on a regular basis.               Review of Systems: Unremarkable except as noted above    Meds     Current Outpatient Medications   Medication Instructions    cholecalciferol (VITAMIN D-3) 25 mcg, oral, Daily PRN    coenzyme Q-10 200 mg, oral, Every other day    docosahexaenoic acid 200 mg capsule 1 capsule, oral, Daily    Eliquis 5 mg, oral, 2 times daily PRN, As needed for palpitations     GLYCINE ORAL 2,000 mg, oral, Daily    losartan (COZAAR) 25 mg, oral, Daily RT    multivitamin tablet 1 tablet, oral, Daily    pravastatin (Pravachol) 10 mg tablet take 1/2 tablet by mouth 5 days weekly        Allergies     Allergies   Allergen Reactions    Cortisone Swelling    Rosuvastatin Swelling    Simvastatin Swelling    Statins-Hmg-Coa Reductase Inhibitors Angioedema         Annotated Problems     Specialty Problems          Cardiology Problems    Essential hypertension    Hyperlipidemia    Paroxysmal atrial fibrillation (CMS/HCC)    Paroxysmal SVT (supraventricular tachycardia)    Pericarditis    Coronary artery disease involving native coronary artery of native heart without angina pectoris        Problem List     Patient Active Problem List    Diagnosis Date Noted    Actinic  "keratosis 12/22/2023    Coronary artery disease involving native coronary artery of native heart without angina pectoris 12/22/2023    Paroxysmal atrial fibrillation (CMS/HCC) 06/09/2023    Essential hypertension 06/09/2023    Hyperlipidemia 06/09/2023    Paroxysmal SVT (supraventricular tachycardia) 06/09/2023    Pericarditis 06/09/2023    Pulmonary nodule, right 06/09/2023    Vitamin D deficiency 06/09/2023       Objective:     Vitals:    03/19/24 0758   BP: 114/78   BP Location: Left arm   Patient Position: Sitting   Pulse: 60   Weight: 89.2 kg (196 lb 9.6 oz)   Height: 1.854 m (6' 1\")      Wt Readings from Last 4 Encounters:   03/19/24 89.2 kg (196 lb 9.6 oz)   02/23/24 88.5 kg (195 lb)   10/13/23 86.2 kg (190 lb)   09/21/23 86.6 kg (191 lb)           LAB:     Lab Results   Component Value Date    WBC 4.5 04/19/2023    HGB 15.9 04/19/2023    HCT 47.9 04/19/2023     04/19/2023    CHOL 133 02/16/2024    TRIG 84 02/16/2024    HDL 47.7 02/16/2024    LDLDIRECT 127 08/27/2018    ALT 10 11/30/2019    AST 15 11/30/2019     03/03/2023    K 4.1 03/03/2023     03/03/2023    CREATININE 0.88 03/03/2023    BUN 19 03/03/2023    CO2 26 03/03/2023    TSH 2.23 08/27/2018    PSA 1.23 02/16/2024    INR 1.3 (H) 11/30/2019    HGBA1C 5.2 08/27/2018       Diagnostic Studies:     Electrocardiogram 12 Lead    Result Date: 2/16/2023  Sinus bradycardia Otherwise normal ECG No previous ECGs available Confirmed by ALBERTO HUDSON, TRICE ([id number]) on 11/30/2019 2:22:36 PM        Radiology:     No orders to display       Physical Exam     General Appearance: alert and oriented to person, place and time, in no acute distress  Cardiovascular: normal rate, regular rhythm, normal S1 and S2, no murmurs, rubs, clicks, or gallops,  no JVD  Pulmonary/Chest: clear to auscultation bilaterally- no wheezes, rales or rhonchi, normal air movement, no respiratory distress  Abdomen: soft, non-tender, non-distended, normal bowel sounds, no masses "   Extremities: no cyanosis, clubbing or edema  Skin: warm and dry, no rash or erythema  Eyes: EOMI  Neck: supple and non-tender without mass, no thyromegaly   Neurological: alert, oriented, normal speech, no focal findings or movement disorder noted  Vascular: 2+ pulses

## 2024-06-05 ENCOUNTER — LAB (OUTPATIENT)
Dept: LAB | Facility: LAB | Age: 74
End: 2024-06-05
Payer: MEDICARE

## 2024-06-05 DIAGNOSIS — I48.0 PAROXYSMAL ATRIAL FIBRILLATION (MULTI): ICD-10-CM

## 2024-06-05 LAB
ALBUMIN SERPL BCP-MCNC: 4.5 G/DL (ref 3.4–5)
ALP SERPL-CCNC: 37 U/L (ref 33–136)
ALT SERPL W P-5'-P-CCNC: 11 U/L (ref 10–52)
ANION GAP SERPL CALC-SCNC: 9 MMOL/L (ref 10–20)
AST SERPL W P-5'-P-CCNC: 16 U/L (ref 9–39)
BASOPHILS # BLD AUTO: 0.03 X10*3/UL (ref 0–0.1)
BASOPHILS NFR BLD AUTO: 0.6 %
BILIRUB SERPL-MCNC: 0.5 MG/DL (ref 0–1.2)
BUN SERPL-MCNC: 24 MG/DL (ref 6–23)
CALCIUM SERPL-MCNC: 9.6 MG/DL (ref 8.6–10.3)
CHLORIDE SERPL-SCNC: 109 MMOL/L (ref 98–107)
CO2 SERPL-SCNC: 26 MMOL/L (ref 21–32)
CREAT SERPL-MCNC: 0.93 MG/DL (ref 0.5–1.3)
EGFRCR SERPLBLD CKD-EPI 2021: 86 ML/MIN/1.73M*2
EOSINOPHIL # BLD AUTO: 0.08 X10*3/UL (ref 0–0.4)
EOSINOPHIL NFR BLD AUTO: 1.7 %
ERYTHROCYTE [DISTWIDTH] IN BLOOD BY AUTOMATED COUNT: 13.8 % (ref 11.5–14.5)
GLUCOSE SERPL-MCNC: 103 MG/DL (ref 74–99)
HCT VFR BLD AUTO: 43.4 % (ref 41–52)
HGB BLD-MCNC: 14.5 G/DL (ref 13.5–17.5)
IMM GRANULOCYTES # BLD AUTO: 0.01 X10*3/UL (ref 0–0.5)
IMM GRANULOCYTES NFR BLD AUTO: 0.2 % (ref 0–0.9)
LYMPHOCYTES # BLD AUTO: 1.2 X10*3/UL (ref 0.8–3)
LYMPHOCYTES NFR BLD AUTO: 25.1 %
MCH RBC QN AUTO: 31.6 PG (ref 26–34)
MCHC RBC AUTO-ENTMCNC: 33.4 G/DL (ref 32–36)
MCV RBC AUTO: 95 FL (ref 80–100)
MONOCYTES # BLD AUTO: 0.33 X10*3/UL (ref 0.05–0.8)
MONOCYTES NFR BLD AUTO: 6.9 %
NEUTROPHILS # BLD AUTO: 3.13 X10*3/UL (ref 1.6–5.5)
NEUTROPHILS NFR BLD AUTO: 65.5 %
NRBC BLD-RTO: 0 /100 WBCS (ref 0–0)
PLATELET # BLD AUTO: 265 X10*3/UL (ref 150–450)
POTASSIUM SERPL-SCNC: 4.1 MMOL/L (ref 3.5–5.3)
PROT SERPL-MCNC: 7.3 G/DL (ref 6.4–8.2)
RBC # BLD AUTO: 4.59 X10*6/UL (ref 4.5–5.9)
SODIUM SERPL-SCNC: 140 MMOL/L (ref 136–145)
WBC # BLD AUTO: 4.8 X10*3/UL (ref 4.4–11.3)

## 2024-06-05 PROCEDURE — 85025 COMPLETE CBC W/AUTO DIFF WBC: CPT

## 2024-06-05 PROCEDURE — 80053 COMPREHEN METABOLIC PANEL: CPT

## 2024-06-05 PROCEDURE — 36415 COLL VENOUS BLD VENIPUNCTURE: CPT

## 2024-06-21 ENCOUNTER — LAB (OUTPATIENT)
Dept: LAB | Facility: LAB | Age: 74
End: 2024-06-21
Payer: MEDICARE

## 2024-06-21 ENCOUNTER — APPOINTMENT (OUTPATIENT)
Dept: PRIMARY CARE | Facility: CLINIC | Age: 74
End: 2024-06-21
Payer: MEDICARE

## 2024-06-21 VITALS
HEART RATE: 61 BPM | DIASTOLIC BLOOD PRESSURE: 70 MMHG | BODY MASS INDEX: 25.84 KG/M2 | WEIGHT: 195 LBS | HEIGHT: 73 IN | TEMPERATURE: 96.8 F | SYSTOLIC BLOOD PRESSURE: 120 MMHG

## 2024-06-21 DIAGNOSIS — I10 ESSENTIAL HYPERTENSION: ICD-10-CM

## 2024-06-21 DIAGNOSIS — I48.0 PAROXYSMAL ATRIAL FIBRILLATION (MULTI): Primary | ICD-10-CM

## 2024-06-21 DIAGNOSIS — I31.9 PERICARDITIS, UNSPECIFIED CHRONICITY, UNSPECIFIED TYPE (HHS-HCC): ICD-10-CM

## 2024-06-21 DIAGNOSIS — E78.2 MIXED HYPERLIPIDEMIA: ICD-10-CM

## 2024-06-21 LAB — CRP SERPL-MCNC: <0.1 MG/DL

## 2024-06-21 PROCEDURE — 99213 OFFICE O/P EST LOW 20 MIN: CPT | Performed by: INTERNAL MEDICINE

## 2024-06-21 PROCEDURE — 1159F MED LIST DOCD IN RCRD: CPT | Performed by: INTERNAL MEDICINE

## 2024-06-21 PROCEDURE — 1160F RVW MEDS BY RX/DR IN RCRD: CPT | Performed by: INTERNAL MEDICINE

## 2024-06-21 PROCEDURE — 3074F SYST BP LT 130 MM HG: CPT | Performed by: INTERNAL MEDICINE

## 2024-06-21 PROCEDURE — 1036F TOBACCO NON-USER: CPT | Performed by: INTERNAL MEDICINE

## 2024-06-21 PROCEDURE — 86140 C-REACTIVE PROTEIN: CPT

## 2024-06-21 PROCEDURE — 36415 COLL VENOUS BLD VENIPUNCTURE: CPT

## 2024-06-21 PROCEDURE — 1123F ACP DISCUSS/DSCN MKR DOCD: CPT | Performed by: INTERNAL MEDICINE

## 2024-06-21 PROCEDURE — 3078F DIAST BP <80 MM HG: CPT | Performed by: INTERNAL MEDICINE

## 2024-06-21 PROCEDURE — 93000 ELECTROCARDIOGRAM COMPLETE: CPT | Performed by: INTERNAL MEDICINE

## 2024-06-21 ASSESSMENT — ENCOUNTER SYMPTOMS
SHORTNESS OF BREATH: 0
CHEST TIGHTNESS: 0
DIZZINESS: 0
EYES NEGATIVE: 1
MUSCULOSKELETAL NEGATIVE: 1
COUGH: 0
GASTROINTESTINAL NEGATIVE: 1
CONSTITUTIONAL NEGATIVE: 1
PALPITATIONS: 1

## 2024-06-21 NOTE — PROGRESS NOTES
"Subjective   Patient ID: Vidal Waite \"Al\" is a 74 y.o. male who presents for Follow-up (4 mo fu).  HPI  Heart Problem  This is a chronic problem. The current episode started more than 1 year ago. The problem occurs rarely. The problem has been waxing and waning. Pertinent negatives include no abdominal pain, anorexia, arthralgias, numbness or weakness. The treatment provided significant relief. He had spell of palpitations 2 days ago and he is wondering if pericarditis is coming back.     Hypertension  This is a chronic problem. The current episode started more than 1 year ago. The problem has been resolved since onset. The problem is controlled. Pertinent negatives include no anxiety or blurred vision. Risk factors for coronary artery disease include male gender. Past treatments include angiotensin blockers. The current treatment provides significant improvement. There are no compliance problems.  There is no history of angina. Cardiology follow up noted. Ew3zzte labs noted.  Past Medical History  History reviewed. No pertinent past medical history.    Social History  Social History     Tobacco Use    Smoking status: Never     Passive exposure: Never    Smokeless tobacco: Never   Vaping Use    Vaping status: Never Used   Substance Use Topics    Alcohol use: Not Currently    Drug use: Not Currently     Types: Marijuana     Comment: long time ago       Family History   No family history on file.    Allergies:  Allergies   Allergen Reactions    Cortisone Swelling    Rosuvastatin Swelling    Simvastatin Swelling    Statins-Hmg-Coa Reductase Inhibitors Angioedema        Outpatient Medications:  Current Outpatient Medications   Medication Instructions    cholecalciferol (VITAMIN D-3) 25 mcg, oral, Daily PRN    coenzyme Q-10 200 mg, oral, Every other day    docosahexaenoic acid 200 mg capsule 1 capsule, oral, Daily    Eliquis 5 mg, oral, 2 times daily PRN, As needed for palpitations     losartan (COZAAR) 25 mg, " "oral, Daily RT    multivitamin tablet 1 tablet, oral, Daily    pravastatin (Pravachol) 10 mg tablet take 1/2 tablet by mouth 5 days weekly        Review of Systems   Constitutional: Negative.    Eyes: Negative.    Respiratory:  Negative for cough, chest tightness and shortness of breath.    Cardiovascular:  Positive for palpitations. Negative for chest pain.   Gastrointestinal: Negative.    Musculoskeletal: Negative.    Skin: Negative.    Neurological:  Negative for dizziness.         Objective       Physical Exam  Vitals reviewed.   Constitutional:       Appearance: Normal appearance. He is normal weight.   HENT:      Head: Normocephalic and atraumatic.   Eyes:      Conjunctiva/sclera: Conjunctivae normal.   Cardiovascular:      Rate and Rhythm: Normal rate and regular rhythm.      Pulses: Normal pulses.      Comments: No rub  Pulmonary:      Effort: Pulmonary effort is normal.      Breath sounds: Normal breath sounds.   Abdominal:      Palpations: Abdomen is soft.   Musculoskeletal:         General: Normal range of motion.      Cervical back: Normal range of motion.   Skin:     General: Skin is warm and dry.   Neurological:      General: No focal deficit present.     /70 (BP Location: Left arm, Patient Position: Sitting, BP Cuff Size: Adult)   Pulse 61   Temp 36 °C (96.8 °F) (Temporal)   Ht 1.854 m (6' 1\")   Wt 88.5 kg (195 lb)   BMI 25.73 kg/m²      Assessment/Plan   Problem List Items Addressed This Visit       Paroxysmal atrial fibrillation (Multi) - Primary    Essential hypertension   He came because of regular checkup and follow-up, laboratories were reviewed minor abnormalities were not of clinical concern as he was asking.  About 72 hours ago he felt like he was having palpitations and skipped beats, we did ECG today he was bradycardic, that is nothing unusual.  He has history of paroxysmal atrial fibrillation and SVT.  There is no acute pericarditis I can suspect although we did EKG there is no " changes of acute pericarditis, CRP will be done, lipids are stable, he stays on statins, he stays on Eliquis, physical exam is otherwise unremarkable, screening test are reviewed, he does fit test for colon cancer screening.  In view of normal ECG I was told that there is no evidence of pericarditis and CRP will confirm he will return back in 4 months with lipid profile.

## 2024-08-08 DIAGNOSIS — E83.19 IRON OVERLOAD: Primary | ICD-10-CM

## 2024-08-09 ENCOUNTER — LAB (OUTPATIENT)
Dept: LAB | Facility: LAB | Age: 74
End: 2024-08-09
Payer: MEDICARE

## 2024-08-09 DIAGNOSIS — E83.19 IRON OVERLOAD: ICD-10-CM

## 2024-08-09 LAB — FERRITIN SERPL-MCNC: 50 NG/ML (ref 20–300)

## 2024-08-09 PROCEDURE — 82728 ASSAY OF FERRITIN: CPT

## 2024-08-09 PROCEDURE — 36415 COLL VENOUS BLD VENIPUNCTURE: CPT

## 2024-08-09 PROCEDURE — 81256 HFE GENE: CPT

## 2024-08-19 LAB
ELECTRONICALLY SIGNED BY: ABNORMAL
HFE GENE MUT TESTED BLD/T: ABNORMAL
HFE P.C282Y BLD/T QL: NORMAL
HFE P.H63D BLD/T QL: ABNORMAL

## 2024-09-03 DIAGNOSIS — E78.2 MIXED HYPERLIPIDEMIA: ICD-10-CM

## 2024-09-03 RX ORDER — PRAVASTATIN SODIUM 10 MG/1
TABLET ORAL
Qty: 45 TABLET | Refills: 1 | Status: SHIPPED | OUTPATIENT
Start: 2024-09-03

## 2024-09-10 ENCOUNTER — APPOINTMENT (OUTPATIENT)
Dept: CARDIOLOGY | Facility: CLINIC | Age: 74
End: 2024-09-10
Payer: MEDICARE

## 2024-09-10 ENCOUNTER — TELEPHONE (OUTPATIENT)
Dept: CARDIOLOGY | Facility: CLINIC | Age: 74
End: 2024-09-10

## 2024-09-10 VITALS
BODY MASS INDEX: 25.63 KG/M2 | HEIGHT: 73 IN | HEART RATE: 58 BPM | DIASTOLIC BLOOD PRESSURE: 76 MMHG | SYSTOLIC BLOOD PRESSURE: 122 MMHG | WEIGHT: 193.4 LBS

## 2024-09-10 DIAGNOSIS — I48.0 PAROXYSMAL ATRIAL FIBRILLATION (MULTI): ICD-10-CM

## 2024-09-10 DIAGNOSIS — I10 ESSENTIAL HYPERTENSION: ICD-10-CM

## 2024-09-10 DIAGNOSIS — E78.2 MIXED HYPERLIPIDEMIA: ICD-10-CM

## 2024-09-10 PROCEDURE — 3078F DIAST BP <80 MM HG: CPT | Performed by: INTERNAL MEDICINE

## 2024-09-10 PROCEDURE — G2211 COMPLEX E/M VISIT ADD ON: HCPCS | Performed by: INTERNAL MEDICINE

## 2024-09-10 PROCEDURE — 1123F ACP DISCUSS/DSCN MKR DOCD: CPT | Performed by: INTERNAL MEDICINE

## 2024-09-10 PROCEDURE — 3074F SYST BP LT 130 MM HG: CPT | Performed by: INTERNAL MEDICINE

## 2024-09-10 PROCEDURE — 99214 OFFICE O/P EST MOD 30 MIN: CPT | Performed by: INTERNAL MEDICINE

## 2024-09-10 PROCEDURE — 1036F TOBACCO NON-USER: CPT | Performed by: INTERNAL MEDICINE

## 2024-09-10 PROCEDURE — 1159F MED LIST DOCD IN RCRD: CPT | Performed by: INTERNAL MEDICINE

## 2024-09-10 NOTE — TELEPHONE ENCOUNTER
14 day Bob [id number] does not require prior auth-spoke to Flora CONSTANTINO at [facility] call ref#[id number].

## 2024-09-10 NOTE — PROGRESS NOTES
Patient:  Vidal Waite  YOB: 1950  MRN: [id number]       Impression/Plan:     Diagnoses and all orders for this visit:  Paroxysmal atrial fibrillation (Multi)  -    His [id number] score is is 3 giving him a 3 to 4% annual risk of stroke.  -    I did review with him that A-fib is not always symptomatic.  He is very concerned that he bruises very easily and bleeds easily when he takes Eliquis daily.        -    Will plan to obtain a 14-day event monitor to determine if during that timeframe there is any asymptomatic atrial fibrillation.  I think he would be best served by using it daily and I reviewed that with him.  Certainly if there is absolutely no atrial fibrillation his risk is not as high as an individual with A-fib constantly but is still significantly elevated at over 3% annual risk.  Will review with him the data once monitor is available.  He will decide how to proceed after we weigh the pros and cons of anticoagulation on an as-needed basis versus continuous.  Essential hypertension  -     Well-controlled without adverse effect of the current medication normal renal function  Mixed hyperlipidemia  -    He does have very mild plaque throughout the coronary tree on coronary CT angiography therefore continue aggressive control of cholesterol      Chief Complaint/Active Symptoms:       Vidal Waite is a 74 y.o. male who presents with mild coronary disease by virtue of coronary CT angiography October 2020 showing trivial irregularities and a calcium score of 214. He has never had coronary events or angina. Also longstanding hypertension and paroxysmal atrial fibrillation. He developed initial onset of pericarditis in October 2020 and for the following year had about 6 recurrences. I had seen him in February2022 and he was feeling better. He had been reluctant to use colchicine on a regular basis after reading some of the adverse effects and also utilizing Aleve had help with  symptoms. He done fairly well through the majority of 2022 but between July and December started having increasing episodes.  Echo January 2023  60% EF normal study no effusion.  As of September 2024 he had had no symptomatic atrial fibrillation since its first being noted in 2017.    After 6 months of Aleve colchicine combination he was rendered asymptomatic and markers of inflammation had normalized.  By the end of 2023 he was asymptomatic.    I had seen him last 3/19/2024 at which time he was asymptomatic.  He had not had recurrent atrial fibrillation during his time of pericarditis.    Laboratory studies 6/5/2024 CBC normal, CMP normal except glucose 103, C-reactive protein normal.  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  C282Y was normal.  Testing notes that 16% of healthy Caucasians are unaffected carriers.  Ferritin was normal.  February 2024 cholesterol 133 HDL 48 LDL 69    ECG June 2024 sinus bradycardia 55.  Incomplete right bundle branch block otherwise unremarkable    He denies angina, dyspnea, palpitation, edema, lightheadedness or syncope.  He has had no symptoms of claudication or neurologic deterioration.  There have been no hospitalizations or emergency room visits since last office visit.    He has absolutely no symptoms of cardiovascular disease he is very active.  No recurrent pericarditis symptoms in nearly a year.  He did have screening for hemochromatosis and that a home genetic test suggested hemochromatosis.  He is a carrier but does not have the disease.  He prefers to take his Eliquis on an as-needed basis.  He has had occasional symptoms that last for less than a minute and has not needed to take Eliquis.  To date he has not had documented asymptomatic atrial fibrillation but he is aware that that is a possibility.               Review of Systems: Unremarkable except as noted above    Meds     Current Outpatient Medications   Medication Instructions    cholecalciferol  "(VITAMIN D-3) 25 mcg, oral, Daily PRN    coenzyme Q-10 200 mg, oral, Every other day    docosahexaenoic acid 200 mg capsule 1 capsule, oral, Daily    Eliquis 5 mg, oral, 2 times daily PRN, As needed for palpitations     losartan (COZAAR) 25 mg, oral, Daily RT    multivitamin tablet 1 tablet, oral, Daily    OMEGA-3-DHA-EPA-DPA-FISH OIL ORAL oral, Daily    pravastatin (Pravachol) 10 mg tablet Take 1/2 tablet by mouth at bedtime. 7 days a week        Allergies     Allergies   Allergen Reactions    Cortisone Swelling    Rosuvastatin Swelling    Simvastatin Swelling    Statins-Hmg-Coa Reductase Inhibitors Angioedema         Annotated Problems     Specialty Problems          Cardiology Problems    Essential hypertension    Hyperlipidemia    Paroxysmal atrial fibrillation (Multi)    Paroxysmal SVT (supraventricular tachycardia) ([facility])    Pericarditis ([facility])    Coronary artery disease involving native coronary artery of native heart without angina pectoris        Problem List     Patient Active Problem List    Diagnosis Date Noted    Actinic keratosis 12/22/2023    Coronary artery disease involving native coronary artery of native heart without angina pectoris 12/22/2023    Paroxysmal atrial fibrillation (Multi) 06/09/2023    Essential hypertension 06/09/2023    Hyperlipidemia 06/09/2023    Paroxysmal SVT (supraventricular tachycardia) (CMS-HCC) 06/09/2023    Pericarditis ([facility]) 06/09/2023    Pulmonary nodule, right 06/09/2023    Vitamin D deficiency 06/09/2023       Objective:     Vitals:    09/10/24 0757   BP: 122/76   BP Location: Left arm   Patient Position: Sitting   Pulse: 58   Weight: 87.7 kg (193 lb 6.4 oz)   Height: 1.854 m (6' 1\")      Wt Readings from Last 4 Encounters:   09/10/24 87.7 kg (193 lb 6.4 oz)   06/21/24 88.5 kg (195 lb)   03/19/24 89.2 kg (196 lb 9.6 oz)   02/23/24 88.5 kg (195 lb)           LAB:     Lab Results   Component Value Date    WBC 4.8 06/05/2024    HGB 14.5 06/05/2024    HCT 43.4 " 06/05/2024     06/05/2024    CHOL 133 02/16/2024    TRIG 84 02/16/2024    HDL 47.7 02/16/2024    LDLDIRECT 127 08/27/2018    ALT 11 06/05/2024    AST 16 06/05/2024     06/05/2024    K 4.1 06/05/2024     (H) 06/05/2024    CREATININE 0.93 06/05/2024    BUN 24 (H) 06/05/2024    CO2 26 06/05/2024    TSH 2.23 08/27/2018    PSA 1.23 02/16/2024    INR 1.3 (H) 11/30/2019    HGBA1C 5.2 08/27/2018       Diagnostic Studies:     Electrocardiogram 12 Lead    Result Date: 2/16/2023  Sinus bradycardia Otherwise normal ECG No previous ECGs available Confirmed by TRICE DOMINGUEZ MD ([id number]) on 11/30/2019 2:22:36 PM        Radiology:     No orders to display       Physical Exam     General Appearance: alert and oriented to person, place and time, in no acute distress  Cardiovascular: normal rate, regular rhythm, normal S1 and S2, no murmurs, rubs, clicks, or gallops,  no JVD  Pulmonary/Chest: clear to auscultation bilaterally- no wheezes, rales or rhonchi, normal air movement, no respiratory distress  Abdomen: soft, non-tender, non-distended, normal bowel sounds, no masses   Extremities: no cyanosis, clubbing or edema  Skin: warm and dry, no rash or erythema  Eyes: EOMI  Neck: supple and non-tender without mass, no thyromegaly   Neurological: alert, oriented, normal speech, no focal findings or movement disorder noted

## 2024-09-17 ENCOUNTER — APPOINTMENT (OUTPATIENT)
Dept: CARDIOLOGY | Facility: CLINIC | Age: 74
End: 2024-09-17
Payer: MEDICARE

## 2024-09-24 NOTE — TELEPHONE ENCOUNTER
Received rx fax from [facility] requesting refills for pravastatin 10 mg 1/2 tab 5 days a week. Per chart this medication was recently filled 9/3/24, 1/2 tab 7 days a week. Called and confirmed with patient correct dose and directions as given from PCP. Patient stated PCP will now be filling this medication.

## 2024-10-02 DIAGNOSIS — E78.2 MIXED HYPERLIPIDEMIA: ICD-10-CM

## 2024-10-02 RX ORDER — PRAVASTATIN SODIUM 10 MG/1
TABLET ORAL
Qty: 45 TABLET | Refills: 3 | Status: SHIPPED | OUTPATIENT
Start: 2024-10-02

## 2024-10-03 ENCOUNTER — APPOINTMENT (OUTPATIENT)
Dept: CARDIOLOGY | Facility: CLINIC | Age: 74
End: 2024-10-03
Payer: MEDICARE

## 2024-10-03 DIAGNOSIS — I10 ESSENTIAL HYPERTENSION: ICD-10-CM

## 2024-10-03 DIAGNOSIS — I48.0 PAROXYSMAL ATRIAL FIBRILLATION (MULTI): ICD-10-CM

## 2024-10-03 DIAGNOSIS — E78.2 MIXED HYPERLIPIDEMIA: ICD-10-CM

## 2024-10-03 PROCEDURE — 93246 EXT ECG>7D<15D RECORDING: CPT | Performed by: INTERNAL MEDICINE

## 2024-10-03 PROCEDURE — 93248 EXT ECG>7D<15D REV&INTERPJ: CPT | Performed by: INTERNAL MEDICINE

## 2024-10-14 ENCOUNTER — LAB (OUTPATIENT)
Dept: LAB | Facility: LAB | Age: 74
End: 2024-10-14
Payer: MEDICARE

## 2024-10-14 DIAGNOSIS — E78.2 MIXED HYPERLIPIDEMIA: ICD-10-CM

## 2024-10-14 LAB
CHOLEST SERPL-MCNC: 136 MG/DL (ref 0–199)
CHOLESTEROL/HDL RATIO: 2.9
HDLC SERPL-MCNC: 46.6 MG/DL
LDLC SERPL CALC-MCNC: 71 MG/DL
NON HDL CHOLESTEROL: 89 MG/DL (ref 0–149)
TRIGL SERPL-MCNC: 93 MG/DL (ref 0–149)
VLDL: 19 MG/DL (ref 0–40)

## 2024-10-14 PROCEDURE — 80061 LIPID PANEL: CPT

## 2024-10-14 PROCEDURE — 36415 COLL VENOUS BLD VENIPUNCTURE: CPT

## 2024-10-18 ENCOUNTER — APPOINTMENT (OUTPATIENT)
Dept: PRIMARY CARE | Facility: CLINIC | Age: 74
End: 2024-10-18
Payer: MEDICARE

## 2024-10-18 VITALS
DIASTOLIC BLOOD PRESSURE: 77 MMHG | BODY MASS INDEX: 25.84 KG/M2 | HEART RATE: 67 BPM | SYSTOLIC BLOOD PRESSURE: 120 MMHG | HEIGHT: 73 IN | TEMPERATURE: 96 F | WEIGHT: 195 LBS

## 2024-10-18 DIAGNOSIS — Z12.5 PROSTATE CANCER SCREENING: ICD-10-CM

## 2024-10-18 DIAGNOSIS — I25.10 CORONARY ARTERY DISEASE INVOLVING NATIVE CORONARY ARTERY OF NATIVE HEART WITHOUT ANGINA PECTORIS: ICD-10-CM

## 2024-10-18 DIAGNOSIS — I73.9 PAD (PERIPHERAL ARTERY DISEASE) (CMS-HCC): ICD-10-CM

## 2024-10-18 DIAGNOSIS — I48.0 PAROXYSMAL ATRIAL FIBRILLATION (MULTI): ICD-10-CM

## 2024-10-18 DIAGNOSIS — Z23 FLU VACCINE NEED: ICD-10-CM

## 2024-10-18 DIAGNOSIS — I10 ESSENTIAL HYPERTENSION: ICD-10-CM

## 2024-10-18 DIAGNOSIS — I77.810 THORACIC AORTIC ECTASIA (CMS-HCC): Primary | ICD-10-CM

## 2024-10-18 PROCEDURE — 3074F SYST BP LT 130 MM HG: CPT | Performed by: INTERNAL MEDICINE

## 2024-10-18 PROCEDURE — 3078F DIAST BP <80 MM HG: CPT | Performed by: INTERNAL MEDICINE

## 2024-10-18 PROCEDURE — 1036F TOBACCO NON-USER: CPT | Performed by: INTERNAL MEDICINE

## 2024-10-18 PROCEDURE — 90662 IIV NO PRSV INCREASED AG IM: CPT | Performed by: INTERNAL MEDICINE

## 2024-10-18 PROCEDURE — 1123F ACP DISCUSS/DSCN MKR DOCD: CPT | Performed by: INTERNAL MEDICINE

## 2024-10-18 PROCEDURE — 1159F MED LIST DOCD IN RCRD: CPT | Performed by: INTERNAL MEDICINE

## 2024-10-18 PROCEDURE — G0008 ADMIN INFLUENZA VIRUS VAC: HCPCS | Performed by: INTERNAL MEDICINE

## 2024-10-18 PROCEDURE — 1160F RVW MEDS BY RX/DR IN RCRD: CPT | Performed by: INTERNAL MEDICINE

## 2024-10-18 PROCEDURE — 99213 OFFICE O/P EST LOW 20 MIN: CPT | Performed by: INTERNAL MEDICINE

## 2024-10-18 ASSESSMENT — ENCOUNTER SYMPTOMS
RESPIRATORY NEGATIVE: 1
OCCASIONAL FEELINGS OF UNSTEADINESS: 0
CONSTITUTIONAL NEGATIVE: 1
EYES NEGATIVE: 1
GASTROINTESTINAL NEGATIVE: 1
DEPRESSION: 1
BRUISES/BLEEDS EASILY: 1
CARDIOVASCULAR NEGATIVE: 1
MUSCULOSKELETAL NEGATIVE: 1
LOSS OF SENSATION IN FEET: 0
NEUROLOGICAL NEGATIVE: 1

## 2024-10-18 ASSESSMENT — PATIENT HEALTH QUESTIONNAIRE - PHQ9
2. FEELING DOWN, DEPRESSED OR HOPELESS: SEVERAL DAYS
1. LITTLE INTEREST OR PLEASURE IN DOING THINGS: SEVERAL DAYS
10. IF YOU CHECKED OFF ANY PROBLEMS, HOW DIFFICULT HAVE THESE PROBLEMS MADE IT FOR YOU TO DO YOUR WORK, TAKE CARE OF THINGS AT HOME, OR GET ALONG WITH OTHER PEOPLE: NOT DIFFICULT AT ALL
SUM OF ALL RESPONSES TO PHQ9 QUESTIONS 1 AND 2: 2

## 2024-10-18 ASSESSMENT — COLUMBIA-SUICIDE SEVERITY RATING SCALE - C-SSRS
2. HAVE YOU ACTUALLY HAD ANY THOUGHTS OF KILLING YOURSELF?: NO
6. HAVE YOU EVER DONE ANYTHING, STARTED TO DO ANYTHING, OR PREPARED TO DO ANYTHING TO END YOUR LIFE?: NO
1. IN THE PAST MONTH, HAVE YOU WISHED YOU WERE DEAD OR WISHED YOU COULD GO TO SLEEP AND NOT WAKE UP?: NO

## 2024-10-18 NOTE — PROGRESS NOTES
"Subjective   Patient ID: Vidal Waite \"Al\" is a 74 y.o. male who presents for Follow-up (4 mo fu).  HPI  Heart Problem  This is a chronic problem. The current episode started more than 1 year ago. The problem occurs rarely. The problem has been waxing and waning. Pertinent negatives include no abdominal pain, anorexia, arthralgias, numbness or weakness. The treatment provided significant relief. He had spell of palpitations 2 days ago and he is wondering if pericarditis is coming back.Lipids noted, no spells of pericarditis and not on eliquis consistently6, has holter done and report awaited.     Hypertension  This is a chronic problem. The current episode started more than 1 year ago. The problem has been resolved since onset. The problem is controlled. Pertinent negatives include no anxiety or blurred vision. Risk factors for coronary artery disease include male gender. Past treatments include angiotensin blockers. The current treatment provides significant improvement. There are no compliance problems.  There is no history of angina. Cardiology follow up noted.  Past Medical History  History reviewed. No pertinent past medical history.    Social History  Social History     Tobacco Use    Smoking status: Never     Passive exposure: Never    Smokeless tobacco: Never   Vaping Use    Vaping status: Never Used   Substance Use Topics    Alcohol use: Not Currently    Drug use: Not Currently     Types: Marijuana     Comment: long time ago       Family History   No family history on file.    Allergies:  Allergies   Allergen Reactions    Cortisone Swelling    Rosuvastatin Swelling    Simvastatin Swelling    Statins-Hmg-Coa Reductase Inhibitors Angioedema        Outpatient Medications:  Current Outpatient Medications   Medication Instructions    cholecalciferol (VITAMIN D-3) 25 mcg, oral, Daily PRN    coenzyme Q-10 200 mg, oral, Every other day    docosahexaenoic acid 200 mg capsule 1 capsule, oral, Daily    Eliquis " "5 mg, oral, 2 times daily PRN, As needed for palpitations     losartan (COZAAR) 25 mg, oral, Daily RT    multivitamin tablet 1 tablet, oral, Daily    OMEGA-3-DHA-EPA-DPA-FISH OIL ORAL oral, Daily    pravastatin (Pravachol) 10 mg tablet Take 1/2 tablet by mouth at bedtime. 7 days a week        Review of Systems   Constitutional: Negative.    HENT: Negative.     Eyes: Negative.    Respiratory: Negative.     Cardiovascular: Negative.    Gastrointestinal: Negative.    Musculoskeletal: Negative.    Neurological: Negative.    Hematological:  Bruises/bleeds easily.         Objective       Physical Exam  Vitals reviewed.   Constitutional:       Appearance: Normal appearance. He is normal weight.   HENT:      Head: Normocephalic and atraumatic.   Eyes:      Conjunctiva/sclera: Conjunctivae normal.   Cardiovascular:      Rate and Rhythm: Normal rate and regular rhythm.      Pulses:           Dorsalis pedis pulses are 2+ on the right side and 2+ on the left side.        Posterior tibial pulses are 2+ on the right side and 2+ on the left side.   Pulmonary:      Effort: Pulmonary effort is normal.      Breath sounds: Normal breath sounds.   Abdominal:      Palpations: Abdomen is soft.   Musculoskeletal:         General: No tenderness or deformity.      Cervical back: Normal range of motion.   Skin:     General: Skin is warm and dry.   Neurological:      General: No focal deficit present.   Psychiatric:         Mood and Affect: Mood normal.     /77 (BP Location: Right arm, Patient Position: Sitting, BP Cuff Size: Adult)   Pulse 67   Temp 35.6 °C (96 °F) (Temporal)   Ht 1.854 m (6' 1\")   Wt 88.5 kg (195 lb)   BMI 25.73 kg/m²      Assessment/Plan   Problem List Items Addressed This Visit       Paroxysmal atrial fibrillation (Multi)    Essential hypertension    Coronary artery disease involving native coronary artery of native heart without angina pectoris    Thoracic aortic ectasia (CMS-HCC) - Primary     Other Visit " Diagnoses       Flu vaccine need        Relevant Orders    Flu vaccine, trivalent, preservative free, HIGH-DOSE, age 65y+ (Fluzone)        Surgery follow-up was reviewed, Eliquis has been taken off but then he says he will use Eliquis intermittently, I told that Eliquis has to be taken on a daily basis or do not take it, intermittent use does not have any therapeutic benefit, he feels like when he has a palpitations he takes Eliquis, Holter report is awaited, [id number] score reviewed.  He remains asymptomatic, he is asking about peripheral arterial disease, I cannot feel pedal pulses as well I do not see any claudication symptoms, he is only on 5 mg of statins, HDL fluctuates, LDL is stable, BP readings are stable, we will do PVR just for the screening purposes more than for obvious PAD which I doubt.  He is particular about his wellbeing, he will continue to follow-up with me on a periodic basis, no spell of pericarditis lately.  No obvious spells of atrial fibrillation lately.Peripheral arterial disease is a suspected diagnosis it is not approved diagnosis.  Ectatic thoracic aorta does not require any further checkup, PSA will be required next time, follow-up in 4 months.

## 2024-11-11 DIAGNOSIS — I10 ESSENTIAL HYPERTENSION: ICD-10-CM

## 2024-11-11 RX ORDER — LOSARTAN POTASSIUM 25 MG/1
25 TABLET ORAL
Qty: 90 TABLET | Refills: 3 | Status: SHIPPED | OUTPATIENT
Start: 2024-11-11

## 2024-11-12 ENCOUNTER — APPOINTMENT (OUTPATIENT)
Dept: CARDIOLOGY | Facility: CLINIC | Age: 74
End: 2024-11-12
Payer: MEDICARE

## 2024-11-12 VITALS
DIASTOLIC BLOOD PRESSURE: 70 MMHG | HEART RATE: 68 BPM | SYSTOLIC BLOOD PRESSURE: 126 MMHG | BODY MASS INDEX: 25.71 KG/M2 | WEIGHT: 194 LBS | HEIGHT: 73 IN

## 2024-11-12 DIAGNOSIS — I48.0 PAROXYSMAL ATRIAL FIBRILLATION (MULTI): ICD-10-CM

## 2024-11-12 DIAGNOSIS — E78.2 MIXED HYPERLIPIDEMIA: ICD-10-CM

## 2024-11-12 DIAGNOSIS — I77.810 THORACIC AORTIC ECTASIA (CMS-HCC): ICD-10-CM

## 2024-11-12 PROCEDURE — 99214 OFFICE O/P EST MOD 30 MIN: CPT | Performed by: INTERNAL MEDICINE

## 2024-11-12 PROCEDURE — 3078F DIAST BP <80 MM HG: CPT | Performed by: INTERNAL MEDICINE

## 2024-11-12 PROCEDURE — 1123F ACP DISCUSS/DSCN MKR DOCD: CPT | Performed by: INTERNAL MEDICINE

## 2024-11-12 PROCEDURE — 3074F SYST BP LT 130 MM HG: CPT | Performed by: INTERNAL MEDICINE

## 2024-11-12 PROCEDURE — 1159F MED LIST DOCD IN RCRD: CPT | Performed by: INTERNAL MEDICINE

## 2024-11-12 PROCEDURE — G2211 COMPLEX E/M VISIT ADD ON: HCPCS | Performed by: INTERNAL MEDICINE

## 2024-11-12 RX ORDER — APIXABAN 5 MG/1
TABLET, FILM COATED ORAL
Qty: 180 TABLET | Refills: 1 | Status: SHIPPED | OUTPATIENT
Start: 2024-11-12

## 2024-11-12 NOTE — PROGRESS NOTES
Patient:  Vidal Waite  YOB: 1950  MRN: [id number]       Impression/Plan:     Diagnoses and all orders for this visit:  Paroxysmal atrial fibrillation (Multi)  -    Although he has a low burden of atrial fibrillation he has atrial fibrillation nonetheless.  Most recent monitoring in August interpreted as intermittent SVT but on my review there is 1 relatively short episode clearly atrial fibrillation and several others where A-fib is a real possibility.    -     His age, hypertension and coronary disease albeit mild gives him a [id number]-[id number] score of 3.  That being the case I think he would be best served by long-term anticoagulation and he is agreeable to 5 twice daily of Eliquis.  He said he had bruised so badly before that he was reluctant to take it regularly.  I discussed with him that at that is still an issue we could consider watchman at a later date    Thoracic aortic ectasia (CMS-HCC)  -    The CT scan suggesting ectatic aorta with 4 cm diameter was a study without contrast this is not quite is accurate furthermore it was not likely gated.  -    Intervention on ascending aorta would only occur at measurements greater than 5 and at his size 40 is just over the upper limits of normal.  Therefore we will plan to see him in about 6 months and just prior to that study will obtain CTA of the chest with contrast to further assess the aorta for any progression    Mixed hyperlipidemia  -     Excellent control on current dose of pravastatin encouraged him to continue this dose    No recurrent symptoms of pericarditis, blood pressures have been well-controlled.  I agree with Dr. Ramirez that with his ability to walk 3 miles, 2+ posterior tibial and dorsalis pedis pulses and no skin changes likelihood of significant peripheral vascular disease is low.      Chief Complaint/Active Symptoms:       Vidal Waite is a 74 y.o. male who presents with  mild coronary disease by virtue of coronary CT  angiography October 2020 showing trivial irregularities and a calcium score of 214. He has never had coronary events or angina. Also longstanding hypertension and paroxysmal atrial fibrillation. He developed initial onset of pericarditis in October 2020 and for the following year had about 6 recurrences. I had seen him in February2022 and he was feeling better. He had been reluctant to use colchicine on a regular basis after reading some of the adverse effects and also utilizing Aleve had help with symptoms. He done fairly well through the majority of 2022 but between July and December started having increasing episodes.  Echo January 2023  60% EF normal study no effusion.  As of September 2024 he had had no symptomatic atrial fibrillation since its first being noted in 2017.     As to his pericarditis after 6 months of Aleve colchicine combination he was rendered asymptomatic and markers of inflammation had normalized.  By the end of 2023 he was asymptomatic..      I had last seen him 9/10/2024.  He is asymptomatic with good control of blood pressure.  He was continued on pravastatin as it was well-controlled.  Also at that time was using Eliquis on an as-needed basis as he did not like that he was bruising.    10/3/2024 14-day Zio patch monitor showed sinus rhythm throughout short salvos of paroxysmal SVT probably atrial tachycardia but no atrial fibrillation identified.     I have further reviewed study and 1 episode is atrial fibrillation and it is rather fast albeit short and asymptomatic.    10/14/2024 cholesterol 136 HDL 47 LDL 71 triglycerides 93 excellent control on low-dose pravastatin  CBC and CBC had been ordered in August they have not yet been done.         CMP in June had shown creatinine 0.9    CBC had been normal    He denies angina, dyspnea, palpitation, edema, lightheadedness or syncope.  He has had no symptoms of claudication or neurologic deterioration.  There have been no hospitalizations or  emergency room visits since last office visit.    He has been very active he can easily walk 3 miles and does that on a regular basis.  He has had no symptoms to suggest recurrent pericarditis.  He does review with me that his father has significant peripheral vascular disease but he himself has no claudication skin changes or ulcerations of his legs.    In March 2023 he had a CT of the chest without contrast.  This study described an ectatic aorta measuring 4 cm unchanged from prior studies.  Importantly in 2020 CT angiography of the aorta with contrast at the time of coronary CT angiogram did not show dilatation      Review of Systems: Unremarkable except as noted above    Meds     Current Outpatient Medications   Medication Instructions    cholecalciferol (VITAMIN D-3) 25 mcg, Daily PRN    coenzyme Q-10 200 mg, Every other day    docosahexaenoic acid 200 mg capsule 1 capsule, oral, Daily    Eliquis 5 mg, 2 times daily PRN    losartan (COZAAR) 25 mg, oral, Daily RT    multivitamin tablet 1 tablet, Daily    OMEGA-3-DHA-EPA-DPA-FISH OIL ORAL Daily    pravastatin (Pravachol) 10 mg tablet Take 1/2 tablet by mouth at bedtime. 7 days a week        Allergies     Allergies   Allergen Reactions    Cortisone Swelling    Rosuvastatin Swelling    Simvastatin Swelling    Statins-Hmg-Coa Reductase Inhibitors Angioedema         Annotated Problems     Specialty Problems          Cardiology Problems    Essential hypertension    Hyperlipidemia    Paroxysmal atrial fibrillation (Multi)    Paroxysmal SVT (supraventricular tachycardia) (CMS-HCC)    Pericarditis ([facility])    Coronary artery disease involving native coronary artery of native heart without angina pectoris    Thoracic aortic ectasia (CMS-HCC)        Problem List     Patient Active Problem List    Diagnosis Date Noted    Thoracic aortic ectasia (CMS-HCC) 10/18/2024    Actinic keratosis 12/22/2023    Coronary artery disease involving native coronary artery of native heart  "without angina pectoris 12/22/2023    Paroxysmal atrial fibrillation (Multi) 06/09/2023    Essential hypertension 06/09/2023    Hyperlipidemia 06/09/2023    Paroxysmal SVT (supraventricular tachycardia) (CMS-HCC) 06/09/2023    Pericarditis ([facility]) 06/09/2023    Pulmonary nodule, right 06/09/2023    Vitamin D deficiency 06/09/2023       Objective:     Vitals:    11/12/24 1301   BP: 126/70   BP Location: Left arm   Patient Position: Sitting   Pulse: 68   Weight: 88 kg (194 lb)   Height: 1.854 m (6' 1\")      Wt Readings from Last 4 Encounters:   11/12/24 88 kg (194 lb)   10/18/24 88.5 kg (195 lb)   09/10/24 87.7 kg (193 lb 6.4 oz)   06/21/24 88.5 kg (195 lb)           LAB:     Lab Results   Component Value Date    WBC 4.8 06/05/2024    HGB 14.5 06/05/2024    HCT 43.4 06/05/2024     06/05/2024    CHOL 136 10/14/2024    TRIG 93 10/14/2024    HDL 46.6 10/14/2024    LDLDIRECT 127 08/27/2018    ALT 11 06/05/2024    AST 16 06/05/2024     06/05/2024    K 4.1 06/05/2024     (H) 06/05/2024    CREATININE 0.93 06/05/2024    BUN 24 (H) 06/05/2024    CO2 26 06/05/2024    TSH 2.23 08/27/2018    PSA 1.23 02/16/2024    INR 1.3 (H) 11/30/2019    HGBA1C 5.2 08/27/2018       Diagnostic Studies:     Electrocardiogram 12 Lead    Result Date: 2/16/2023  Sinus bradycardia Otherwise normal ECG No previous ECGs available Confirmed by ALBERTO HUDSON, TRICE ([id number]) on 11/30/2019 2:22:36 PM        Radiology:     No orders to display       Physical Exam     General Appearance: alert and oriented to person, place and time, in no acute distress  Cardiovascular: normal rate, regular rhythm, normal S1 and S2, no murmurs, rubs, clicks, or gallops,  no JVD  Pulmonary/Chest: clear to auscultation bilaterally- no wheezes, rales or rhonchi, normal air movement, no respiratory distress  Abdomen: soft, non-tender, non-distended, normal bowel sounds, no masses   Extremities: no cyanosis, clubbing or edema  Skin: warm and dry, no rash or " erythema  Eyes: EOMI  Neck: supple and non-tender without mass, no thyromegaly   Neurological: alert, oriented, normal speech, no focal findings or movement disorder noted  Vascular: Pulses 2+ posterior tibial dorsalis pedis bilaterally intact posterior tibial somewhat difficult to palpate as rather behind the ankle.

## 2024-11-12 NOTE — PATIENT INSTRUCTIONS
DR JONES IS ORDERING A CTA OF THE THORACIC AORTA BEFORE YOU SEE HIM IN 6 MONTHS.  YOU WILL NEED NON FASTING LABS 1 WEEK PRIOR TO YOUR TESTING.  THE ORDER IS IN YOUR CHART.      PER DR JONES-HE RECOMMENDS ANTICOAGULATION(BLOOD THINNER) DUE TO HAVING ATRIAL FIBRILLATION AND YOUR RISK OF STROKE    RESUME ELIQUIS 5 MG-TAKE IT TWICE A DAY EVERY 12 HOURS FOR FULL PROTECTION    DID YOU KNOW  We have a pharmacy here in the [facility].  They can fill all prescriptions, not just cardiac medications.  Prescriptions from other pharmacies can easily be transferred to the  pharmacy by the  pharmacist on site.   pharmacies offer FREE HOME DELIVERY on medications to anywhere in [place]. They can sync your medications. Typically prescriptions can be ready in 10 - 15 minutes. If pharmacy is unable to fill your  prescription or if cost is more than your paying now the Pharmacist can easily transfer back to your Pharmacy of choice. Pharmacy phone # [phone].     Please bring all medicines, vitamins, and herbal supplements with you in original bottles to every appointment! This is the best way to ensure your medication list in your chart is accurate.    Prescriptions will not be filled unless you are compliant with your follow up appointments or have a follow up appointment scheduled as per instruction of your physician. Refills should be requested at the time of your visit.

## 2024-11-13 PROBLEM — I31.9 PERICARDITIS (HHS-HCC): Status: RESOLVED | Noted: 2023-06-09 | Resolved: 2024-11-13

## 2024-11-20 ENCOUNTER — APPOINTMENT (OUTPATIENT)
Dept: RADIOLOGY | Facility: HOSPITAL | Age: 74
End: 2024-11-20
Payer: MEDICARE

## 2025-02-13 LAB
ALBUMIN SERPL-MCNC: 4.6 G/DL (ref 3.6–5.1)
ALP SERPL-CCNC: 33 U/L (ref 35–144)
ALT SERPL-CCNC: 9 U/L (ref 9–46)
ANION GAP SERPL CALCULATED.4IONS-SCNC: 7 MMOL/L (CALC) (ref 7–17)
AST SERPL-CCNC: 14 U/L (ref 10–35)
BASOPHILS # BLD AUTO: 19 CELLS/UL (ref 0–200)
BASOPHILS NFR BLD AUTO: 0.6 %
BILIRUB SERPL-MCNC: 0.6 MG/DL (ref 0.2–1.2)
BUN SERPL-MCNC: 20 MG/DL (ref 7–25)
CALCIUM SERPL-MCNC: 9.4 MG/DL (ref 8.6–10.3)
CHLORIDE SERPL-SCNC: 106 MMOL/L (ref 98–110)
CO2 SERPL-SCNC: 27 MMOL/L (ref 20–32)
CREAT SERPL-MCNC: 0.94 MG/DL (ref 0.7–1.28)
EGFRCR SERPLBLD CKD-EPI 2021: 85 ML/MIN/1.73M2
EOSINOPHIL # BLD AUTO: 61 CELLS/UL (ref 15–500)
EOSINOPHIL NFR BLD AUTO: 1.9 %
ERYTHROCYTE [DISTWIDTH] IN BLOOD BY AUTOMATED COUNT: 13.4 % (ref 11–15)
GLUCOSE SERPL-MCNC: 85 MG/DL (ref 65–99)
HCT VFR BLD AUTO: 46.6 % (ref 38.5–50)
HGB BLD-MCNC: 15.1 G/DL (ref 13.2–17.1)
LYMPHOCYTES # BLD AUTO: 1075 CELLS/UL (ref 850–3900)
LYMPHOCYTES NFR BLD AUTO: 33.6 %
MCH RBC QN AUTO: 31.3 PG (ref 27–33)
MCHC RBC AUTO-ENTMCNC: 32.4 G/DL (ref 32–36)
MCV RBC AUTO: 96.5 FL (ref 80–100)
MONOCYTES # BLD AUTO: 250 CELLS/UL (ref 200–950)
MONOCYTES NFR BLD AUTO: 7.8 %
NEUTROPHILS # BLD AUTO: 1795 CELLS/UL (ref 1500–7800)
NEUTROPHILS NFR BLD AUTO: 56.1 %
PLATELET # BLD AUTO: 270 THOUSAND/UL (ref 140–400)
PMV BLD REES-ECKER: 10.7 FL (ref 7.5–12.5)
POTASSIUM SERPL-SCNC: 4.3 MMOL/L (ref 3.5–5.3)
PROT SERPL-MCNC: 7.7 G/DL (ref 6.1–8.1)
PSA SERPL-MCNC: 0.94 NG/ML
RBC # BLD AUTO: 4.83 MILLION/UL (ref 4.2–5.8)
SODIUM SERPL-SCNC: 140 MMOL/L (ref 135–146)
WBC # BLD AUTO: 3.2 THOUSAND/UL (ref 3.8–10.8)

## 2025-02-20 ENCOUNTER — APPOINTMENT (OUTPATIENT)
Dept: PRIMARY CARE | Facility: CLINIC | Age: 75
End: 2025-02-20
Payer: MEDICARE

## 2025-02-20 VITALS
TEMPERATURE: 96.6 F | HEIGHT: 73 IN | DIASTOLIC BLOOD PRESSURE: 68 MMHG | SYSTOLIC BLOOD PRESSURE: 116 MMHG | HEART RATE: 66 BPM | WEIGHT: 193 LBS | BODY MASS INDEX: 25.58 KG/M2

## 2025-02-20 DIAGNOSIS — I48.0 PAROXYSMAL ATRIAL FIBRILLATION (MULTI): ICD-10-CM

## 2025-02-20 DIAGNOSIS — I10 ESSENTIAL HYPERTENSION: ICD-10-CM

## 2025-02-20 DIAGNOSIS — E78.2 MIXED HYPERLIPIDEMIA: ICD-10-CM

## 2025-02-20 DIAGNOSIS — Z00.00 ROUTINE GENERAL MEDICAL EXAMINATION AT HEALTH CARE FACILITY: Primary | ICD-10-CM

## 2025-02-20 PROCEDURE — 1159F MED LIST DOCD IN RCRD: CPT | Performed by: INTERNAL MEDICINE

## 2025-02-20 PROCEDURE — 3078F DIAST BP <80 MM HG: CPT | Performed by: INTERNAL MEDICINE

## 2025-02-20 PROCEDURE — 1170F FXNL STATUS ASSESSED: CPT | Performed by: INTERNAL MEDICINE

## 2025-02-20 PROCEDURE — 1123F ACP DISCUSS/DSCN MKR DOCD: CPT | Performed by: INTERNAL MEDICINE

## 2025-02-20 PROCEDURE — G0439 PPPS, SUBSEQ VISIT: HCPCS | Performed by: INTERNAL MEDICINE

## 2025-02-20 PROCEDURE — 3074F SYST BP LT 130 MM HG: CPT | Performed by: INTERNAL MEDICINE

## 2025-02-20 PROCEDURE — 1036F TOBACCO NON-USER: CPT | Performed by: INTERNAL MEDICINE

## 2025-02-20 PROCEDURE — 1160F RVW MEDS BY RX/DR IN RCRD: CPT | Performed by: INTERNAL MEDICINE

## 2025-02-20 RX ORDER — LOSARTAN POTASSIUM 25 MG/1
25 TABLET ORAL
Qty: 90 TABLET | Refills: 3 | Status: SHIPPED | OUTPATIENT
Start: 2025-02-20

## 2025-02-20 RX ORDER — PRAVASTATIN SODIUM 10 MG/1
TABLET ORAL
Qty: 45 TABLET | Refills: 3 | Status: SHIPPED | OUTPATIENT
Start: 2025-02-20

## 2025-02-20 ASSESSMENT — ACTIVITIES OF DAILY LIVING (ADL)
NEEDS ASSISTANCE WITH FOOD: INDEPENDENT
HEARING - LEFT EAR: FUNCTIONAL
USING TELEPHONE: INDEPENDENT
PATIENT'S MEMORY ADEQUATE TO SAFELY COMPLETE DAILY ACTIVITIES?: YES
JUDGMENT_ADEQUATE_SAFELY_COMPLETE_DAILY_ACTIVITIES: YES
USING TRANSPORTATION: INDEPENDENT
WALKS IN HOME: INDEPENDENT
GROCERY SHOPPING: INDEPENDENT
FEEDING YOURSELF: INDEPENDENT
EATING: INDEPENDENT
GROOMING: INDEPENDENT
DOING HOUSEWORK: INDEPENDENT
PREPARING MEALS: INDEPENDENT
STIL DRIVING: YES
TAKING MEDICATION: INDEPENDENT
HEARING - RIGHT EAR: FUNCTIONAL
BATHING: INDEPENDENT
PILL BOX USED: NO
ADEQUATE_TO_COMPLETE_ADL: YES
TOILETING: INDEPENDENT
DRESSING YOURSELF: INDEPENDENT
MANAGING FINANCES: INDEPENDENT

## 2025-02-20 ASSESSMENT — ENCOUNTER SYMPTOMS
WEAKNESS: 0
CONSTITUTIONAL NEGATIVE: 1
DEPRESSION: 0
GASTROINTESTINAL NEGATIVE: 1
CARDIOVASCULAR NEGATIVE: 1
RESPIRATORY NEGATIVE: 1
LOSS OF SENSATION IN FEET: 0
WOUND: 0
OCCASIONAL FEELINGS OF UNSTEADINESS: 0
MUSCULOSKELETAL NEGATIVE: 1
DIZZINESS: 0

## 2025-02-20 ASSESSMENT — COLUMBIA-SUICIDE SEVERITY RATING SCALE - C-SSRS
1. IN THE PAST MONTH, HAVE YOU WISHED YOU WERE DEAD OR WISHED YOU COULD GO TO SLEEP AND NOT WAKE UP?: NO
6. HAVE YOU EVER DONE ANYTHING, STARTED TO DO ANYTHING, OR PREPARED TO DO ANYTHING TO END YOUR LIFE?: NO
2. HAVE YOU ACTUALLY HAD ANY THOUGHTS OF KILLING YOURSELF?: NO

## 2025-02-20 ASSESSMENT — PATIENT HEALTH QUESTIONNAIRE - PHQ9
1. LITTLE INTEREST OR PLEASURE IN DOING THINGS: NOT AT ALL
SUM OF ALL RESPONSES TO PHQ9 QUESTIONS 1 AND 2: 0
SUM OF ALL RESPONSES TO PHQ9 QUESTIONS 1 AND 2: 0
1. LITTLE INTEREST OR PLEASURE IN DOING THINGS: NOT AT ALL
2. FEELING DOWN, DEPRESSED OR HOPELESS: NOT AT ALL
2. FEELING DOWN, DEPRESSED OR HOPELESS: NOT AT ALL

## 2025-02-20 NOTE — PROGRESS NOTES
"Subjective   Reason for Visit: Vidal Waite is an 74 y.o. male here for a Medicare Wellness visit.     Past Medical, Surgical, and Family History reviewed and updated in chart.    Reviewed all medications by prescribing practitioner or clinical pharmacist (such as prescriptions, OTCs, herbal therapies and supplements) and documented in the medical record.    74-year-old male patient was seen for wellness exam.  He is up-to-date with all vaccinations except for booster of COVID-19, seen by cardiology, slightly ectatic aorta is on a surveillance, CHADVASC score was 3 so he is on Eliquis, laboratories were reviewed, slight cyclical neutropenia I see nothing to be concerned, there is the new lab I seen which is called electrolyte balance which was slightly on the lower side of normal nothing to be concerned, alkaline phosphatase is on the lower side of normal nothing to be concerned, he remains physically active, he is not manifesting any obvious spells of atrial fibrillation, he has been seen and monitored on a Holter monitoring periodically with everton of SVT seen, there is no indication for peripheral vascular disease he stays active, he walks he has no claudication.  He does not have any risk factors for peripheral vascular disease.  No depression, he lives independently, he has a community and family support.        Patient Care Team:  Leonid Ramirez MD as PCP - General  Leonid Ramirez MD as PCP - O Medicare Advantage PCP  Saji Julio MD as Consulting Physician (Cardiology)     Review of Systems   Constitutional: Negative.    Respiratory: Negative.     Cardiovascular: Negative.    Gastrointestinal: Negative.    Musculoskeletal: Negative.    Skin:  Negative for rash and wound.   Neurological:  Negative for dizziness and weakness.       Objective   Vitals:  Temp 35.9 °C (96.6 °F) (Temporal)   Ht 1.854 m (6' 1\")   Wt 87.5 kg (193 lb)   BMI 25.46 kg/m²       Physical Exam  Constitutional:       " Appearance: Normal appearance. He is normal weight.   HENT:      Head: Normocephalic.   Eyes:      Conjunctiva/sclera: Conjunctivae normal.   Cardiovascular:      Rate and Rhythm: Normal rate and regular rhythm.      Pulses: Normal pulses.      Heart sounds: Normal heart sounds.   Pulmonary:      Effort: Pulmonary effort is normal.      Breath sounds: Normal breath sounds.   Abdominal:      General: Abdomen is flat.      Palpations: Abdomen is soft.   Musculoskeletal:         General: Normal range of motion.      Cervical back: Neck supple.   Skin:     General: Skin is warm and dry.   Neurological:      General: No focal deficit present.      Mental Status: He is oriented to person, place, and time. Mental status is at baseline.   Psychiatric:         Mood and Affect: Mood normal.         Judgment: Judgment normal.       Assessment & Plan  Mixed hyperlipidemia    Orders:    pravastatin (Pravachol) 10 mg tablet; Take 1/2 tablet by mouth at bedtime. 7 days a week    Essential hypertension    Orders:    losartan (Cozaar) 25 mg tablet; Take 1 tablet (25 mg) by mouth once daily.    Paroxysmal atrial fibrillation (Multi)         Routine general medical examination at health care facility    Orders:    1 Year Follow Up In Primary Care - Wellness Exam; Future    Wellness exam was completed, paroxysmal atrial fibrillation but very infrequently, BP readings are stable, stays on losartan, stays on statins, daily activities are unhindered.  No depression, no anhedonia, daily routine is well-kept and maintained, he is a very well-appearing male patient very much concerned and well-educated.  I did not give any laboratories this time.  Complete physical exam was done, any issues or concerns will be directed towards us, excellent supportive environment in a domiciliary setting he has.  Complete physical exam was done during this process, no spell of pericarditis, follow-up in 4 months.

## 2025-02-20 NOTE — ASSESSMENT & PLAN NOTE
Orders:    pravastatin (Pravachol) 10 mg tablet; Take 1/2 tablet by mouth at bedtime. 7 days a week

## 2025-03-11 ENCOUNTER — APPOINTMENT (OUTPATIENT)
Dept: CARDIOLOGY | Facility: CLINIC | Age: 75
End: 2025-03-11
Payer: MEDICARE

## 2025-05-01 ENCOUNTER — APPOINTMENT (OUTPATIENT)
Dept: RADIOLOGY | Facility: HOSPITAL | Age: 75
End: 2025-05-01
Payer: MEDICARE

## 2025-05-13 ENCOUNTER — APPOINTMENT (OUTPATIENT)
Dept: CARDIOLOGY | Facility: CLINIC | Age: 75
End: 2025-05-13
Payer: MEDICARE

## 2025-05-13 VITALS
BODY MASS INDEX: 25.58 KG/M2 | HEIGHT: 73 IN | HEART RATE: 55 BPM | WEIGHT: 193 LBS | DIASTOLIC BLOOD PRESSURE: 72 MMHG | SYSTOLIC BLOOD PRESSURE: 106 MMHG

## 2025-05-13 DIAGNOSIS — I77.810 THORACIC AORTIC ECTASIA: ICD-10-CM

## 2025-05-13 DIAGNOSIS — E78.2 MIXED HYPERLIPIDEMIA: ICD-10-CM

## 2025-05-13 DIAGNOSIS — I48.0 PAROXYSMAL ATRIAL FIBRILLATION (MULTI): Primary | ICD-10-CM

## 2025-05-13 PROCEDURE — 1036F TOBACCO NON-USER: CPT | Performed by: INTERNAL MEDICINE

## 2025-05-13 PROCEDURE — 99214 OFFICE O/P EST MOD 30 MIN: CPT | Performed by: INTERNAL MEDICINE

## 2025-05-13 PROCEDURE — 3074F SYST BP LT 130 MM HG: CPT | Performed by: INTERNAL MEDICINE

## 2025-05-13 PROCEDURE — 3078F DIAST BP <80 MM HG: CPT | Performed by: INTERNAL MEDICINE

## 2025-05-13 PROCEDURE — 1159F MED LIST DOCD IN RCRD: CPT | Performed by: INTERNAL MEDICINE

## 2025-05-13 PROCEDURE — G2211 COMPLEX E/M VISIT ADD ON: HCPCS | Performed by: INTERNAL MEDICINE

## 2025-05-13 RX ORDER — APIXABAN 5 MG/1
TABLET, FILM COATED ORAL
Qty: 180 TABLET | Refills: 3 | Status: SHIPPED | OUTPATIENT
Start: 2025-05-13

## 2025-05-13 NOTE — PATIENT INSTRUCTIONS
Follow up 6 months    MRA chest without contrast near future. Will call results.      DID YOU KNOW  We have a pharmacy here in the [facility].  They can fill all prescriptions, not just cardiac medications.  Prescriptions from other pharmacies can easily be transferred to the  pharmacy by the  pharmacist on site.   pharmacies offer FREE HOME DELIVERY on medications to anywhere in [place]. They can sync your medications. Typically prescriptions can be ready in 10 - 15 minutes. If pharmacy is unable to fill your  prescription or if cost is more than your paying now the Pharmacist can easily transfer back to your Pharmacy of choice. Pharmacy phone # [phone].     Please bring all medicines, vitamins, and herbal supplements with you in original bottles to every appointment!!!!    Prescriptions will not be filled unless you are compliant with your follow up appointments or have a follow up appointment scheduled as per instruction of your physician. Refills should be requested at the time of your visit.

## 2025-05-13 NOTE — PROGRESS NOTES
Patient:  Vidal Waite  YOB: 1950  MRN: [id number]       Impression/Plan:     Diagnoses and all orders for this visit:  Paroxysmal atrial fibrillation (Multi)  -     No clinical recurrence no bleeding on anticoagulation.  Renal function and CBC remain normal continue Eliquis  Thoracic aortic ectasia (CMS-HCC)  -    Thoracic aorta felt to be ectatic on CT scan without contrast in the past.  Would obtain chest MRI to more optimally assess a ascending aorta as to whether simple ectasia or true aneurysmal dilatation is present  Mixed hyperlipidemia  -     Tolerating statin without adverse effect  History of pericarditis         -     No recurrence no physical exam findings.  It is important to note that temporarily his onset of pericarditis related to COVID-vaccine.  There is association with mild pericardial disease in the setting of COVID-vaccine and would suggest he avoid these in the future and wear a mask in any significant amount of crowd.      Chief Complaint/Active Symptoms:      Chief Complaint   Patient presents with    Follow-up     6 month follow up for afib, hyperlipidemia.        Vidal Waite is a 75 y.o. male who presents with mild coronary disease by virtue of coronary CT angiography October 2020 showing trivial irregularities and a calcium score of 214. He has never had coronary events or angina. Also longstanding hypertension and paroxysmal atrial fibrillation. He developed initial onset of pericarditis in October 2020 and for the following year had about 6 recurrences. I had seen him in February2022 and he was feeling better. He had been reluctant to use colchicine on a regular basis after reading some of the adverse effects and also utilizing Aleve had help with symptoms. He done fairly well through the majority of 2022 but between July and December started having increasing episodes.  Echo January 2023  60% EF normal study no effusion.  As of September 2024 he had had no  symptomatic atrial fibrillation since its first being noted in 2017.     As to his pericarditis after 6 months of Aleve colchicine combination he was rendered asymptomatic and markers of inflammation had normalized.  By the end of 2023 he was asymptomatic.    I had last seen him 11/12/2024.  Monitoring is showing very low burden of atrial fibrillation 1 episode of just a few seconds on a 14-day monitor.  He was asymptomatic but it recommended that he use Eliquis on a regular basis.  Cholesterol been well-controlled.  He had ectatic aorta with plan of CTA to assure no significant dilatation.  He had no recurrence of pericarditis that time walking 3 miles a day.    February 2025 CBC unremarkable CMP normal    He denies angina, dyspnea, palpitation, edema, lightheadedness or syncope.  He has had no symptoms of claudication or neurologic deterioration.  There have been no hospitalizations or emergency room visits since last office visit.    He is very active walking 3 to 3-1/2 miles a day without cardiovascular symptoms.  No symptoms to suggest recurrent atrial fibrillation.  He elected not to have a CT scan because he has had 5 CT scans in the last 5 years and is concerned about potential increased risk of malignancy.               Review of Systems: Unremarkable except as noted above    Meds     Current Outpatient Medications   Medication Instructions    cholecalciferol (VITAMIN D-3) 25 mcg, Daily PRN    coenzyme Q-10 200 mg, Every other day    Eliquis 5 mg tablet 1 TABLET TWICE A DAY EVERY 12 HOURS    losartan (COZAAR) 25 mg, oral, Daily RT    multivitamin tablet 1 tablet, Daily    OMEGA-3-DHA-EPA-DPA-FISH OIL ORAL Daily    pravastatin (Pravachol) 10 mg tablet Take 1/2 tablet by mouth at bedtime. 7 days a week        Allergies   Allergies[1]      Annotated Problems     Specialty Problems          Cardiology Problems    Essential hypertension    Hyperlipidemia    Paroxysmal atrial fibrillation (Multi)    Paroxysmal  "SVT (supraventricular tachycardia) (CMS-HCC)    Coronary artery disease involving native coronary artery of native heart without angina pectoris     coronary CT angiography October 2020 showing trivial irregularities and a calcium score of 214.         Thoracic aortic ectasia (CMS-HCC)        Problem List     Patient Active Problem List    Diagnosis Date Noted    Thoracic aortic ectasia (CMS-[facility]) 10/18/2024    Actinic keratosis 12/22/2023    Coronary artery disease involving native coronary artery of native heart without angina pectoris 12/22/2023    Paroxysmal atrial fibrillation (Multi) 06/09/2023    Essential hypertension 06/09/2023    Hyperlipidemia 06/09/2023    Paroxysmal SVT (supraventricular tachycardia) (CMS-HCC) 06/09/2023    Pulmonary nodule, right 06/09/2023    Vitamin D deficiency 06/09/2023       Objective:     Vitals:    05/13/25 0905   BP: 106/72   BP Location: Left arm   Patient Position: Sitting   Pulse: 55   Weight: 87.5 kg (193 lb)   Height: 1.854 m (6' 1\")      Wt Readings from Last 4 Encounters:   05/13/25 87.5 kg (193 lb)   02/20/25 87.5 kg (193 lb)   11/12/24 88 kg (194 lb)   10/18/24 88.5 kg (195 lb)           LAB:     Lab Results   Component Value Date    WBC 3.2 (L) 02/12/2025    HGB 15.1 02/12/2025    HCT 46.6 02/12/2025     02/12/2025    CHOL 136 10/14/2024    TRIG 93 10/14/2024    HDL 46.6 10/14/2024    LDLDIRECT 127 08/27/2018    ALT 9 02/12/2025    AST 14 02/12/2025     02/12/2025    K 4.3 02/12/2025     02/12/2025    CREATININE 0.94 02/12/2025    BUN 20 02/12/2025    CO2 27 02/12/2025    TSH 2.23 08/27/2018    PSA 0.94 02/12/2025    INR 1.3 (H) 11/30/2019    HGBA1C 5.2 08/27/2018         Physical Exam     General Appearance: alert and oriented to person, place and time, in no acute distress  Cardiovascular: normal rate, regular rhythm, normal S1 and S2, no murmurs, rubs, clicks, or gallops,  no JVD.  No pleural or pericardial friction rub  Pulmonary/Chest: clear to " auscultation bilaterally- no wheezes, rales or rhonchi, normal air movement, no respiratory distress  Abdomen: soft, non-tender, non-distended, normal bowel sounds, no masses   Extremities: no cyanosis, clubbing or edema  Skin: warm and dry, no rash or erythema  Eyes: EOMI  Neck: supple and non-tender without mass, no thyromegaly   Neurological: alert, oriented, normal speech, no focal findings or movement disorder noted  Vascular: Pulses 2+      Provider attestation-scribe documentation  Any medical record entries made by the scribe were at my discretion and personally dictated by me.  I have reviewed the chart and agree that the record accurately reflects my personal performance of the history, physical exam, discussion and plan.        Scribe Attestation  By signing my name below, I, Estephania Zuñiga LPN , [place]   attest that this documentation has been prepared under the direction and in the presence of Dr. Saji Julio MD,FACC.           [1]   Allergies  Allergen Reactions    Cortisone Swelling    Rosuvastatin Swelling     Patient is sensative to statin not allergic, low dosing needed    Simvastatin Swelling     Patient is sensative to statin not allergic, low dosing needed    Statins-Hmg-Coa Reductase Inhibitors Swelling     Patient is sensative to statin not allergic, low dosing needed

## 2025-05-30 ENCOUNTER — HOSPITAL ENCOUNTER (OUTPATIENT)
Dept: RADIOLOGY | Facility: HOSPITAL | Age: 75
Discharge: HOME | End: 2025-05-30
Payer: MEDICARE

## 2025-05-30 DIAGNOSIS — I77.810 THORACIC AORTIC ECTASIA: ICD-10-CM

## 2025-05-30 PROCEDURE — 71555 MRI ANGIO CHEST W OR W/O DYE: CPT

## 2025-06-20 ENCOUNTER — APPOINTMENT (OUTPATIENT)
Dept: PRIMARY CARE | Facility: CLINIC | Age: 75
End: 2025-06-20
Payer: MEDICARE

## 2025-06-20 VITALS
HEART RATE: 56 BPM | WEIGHT: 195 LBS | BODY MASS INDEX: 25.84 KG/M2 | TEMPERATURE: 98 F | HEIGHT: 73 IN | SYSTOLIC BLOOD PRESSURE: 110 MMHG | DIASTOLIC BLOOD PRESSURE: 72 MMHG

## 2025-06-20 DIAGNOSIS — I10 ESSENTIAL HYPERTENSION: ICD-10-CM

## 2025-06-20 DIAGNOSIS — I48.0 PAROXYSMAL ATRIAL FIBRILLATION (MULTI): Primary | ICD-10-CM

## 2025-06-20 DIAGNOSIS — E78.2 MIXED HYPERLIPIDEMIA: ICD-10-CM

## 2025-06-20 PROCEDURE — 3078F DIAST BP <80 MM HG: CPT | Performed by: INTERNAL MEDICINE

## 2025-06-20 PROCEDURE — G2211 COMPLEX E/M VISIT ADD ON: HCPCS | Performed by: INTERNAL MEDICINE

## 2025-06-20 PROCEDURE — 99213 OFFICE O/P EST LOW 20 MIN: CPT | Performed by: INTERNAL MEDICINE

## 2025-06-20 PROCEDURE — 3074F SYST BP LT 130 MM HG: CPT | Performed by: INTERNAL MEDICINE

## 2025-06-20 PROCEDURE — 1036F TOBACCO NON-USER: CPT | Performed by: INTERNAL MEDICINE

## 2025-06-20 PROCEDURE — 1159F MED LIST DOCD IN RCRD: CPT | Performed by: INTERNAL MEDICINE

## 2025-06-20 ASSESSMENT — ENCOUNTER SYMPTOMS
ARTHRALGIAS: 0
CARDIOVASCULAR NEGATIVE: 1
DEPRESSION: 0
CONSTITUTIONAL NEGATIVE: 1
OCCASIONAL FEELINGS OF UNSTEADINESS: 0
WEAKNESS: 0
DIZZINESS: 0
LOSS OF SENSATION IN FEET: 0
GASTROINTESTINAL NEGATIVE: 1
RESPIRATORY NEGATIVE: 1

## 2025-06-20 ASSESSMENT — PATIENT HEALTH QUESTIONNAIRE - PHQ9
2. FEELING DOWN, DEPRESSED OR HOPELESS: NOT AT ALL
SUM OF ALL RESPONSES TO PHQ9 QUESTIONS 1 AND 2: 0
1. LITTLE INTEREST OR PLEASURE IN DOING THINGS: NOT AT ALL

## 2025-06-20 NOTE — PROGRESS NOTES
"Subjective   Patient ID: Vidal Waite \"Al\" is a 75 y.o. male who presents for Follow-up (4 mo fu).  HPI  Heart Problem  This is a chronic problem. The current episode started more than 1 year ago. The problem occurs rarely. The problem has been waxing and waning. Pertinent negatives include no abdominal pain, anorexia, arthralgias, numbness or weakness. The treatment provided significant relief. He had spell of palpitations 2 days ago and he is wondering if pericarditis is coming back.Lipids noted, no spells of pericarditis and not on eliquis consistently6, has holter done and report awaited.MR angiogram noted. 4.1 cm asc aorta     Hypertension  This is a chronic problem. The current episode started more than 1 year ago. The problem has been resolved since onset. The problem is controlled. Pertinent negatives include no anxiety or blurred vision. Risk factors for coronary artery disease include male gender. Past treatments include angiotensin blockers. The current treatment provides significant improvement. There are no compliance problems.  There is no history of angina  Past Medical History  Medical History[1]    Social History  Social History[2]    Family History   Family History[3]    Allergies:  RX Allergies[4]     Outpatient Medications:  Current Outpatient Medications   Medication Instructions    cholecalciferol (VITAMIN D-3) 25 mcg, Daily PRN    coenzyme Q-10 200 mg, Every other day    Eliquis 5 mg tablet 1 TABLET TWICE A DAY EVERY 12 HOURS    losartan (COZAAR) 25 mg, oral, Daily RT    multivitamin tablet 1 tablet, Daily    OMEGA-3-DHA-EPA-DPA-FISH OIL ORAL Daily    pravastatin (Pravachol) 10 mg tablet Take 1/2 tablet by mouth at bedtime. 7 days a week        Review of Systems   Constitutional: Negative.    Respiratory: Negative.     Cardiovascular: Negative.    Gastrointestinal: Negative.    Musculoskeletal:  Negative for arthralgias.   Neurological:  Negative for dizziness and weakness.        " "Problems with name sometimes         Objective       Physical Exam  Vitals reviewed.   Constitutional:       Appearance: Normal appearance. He is normal weight.   HENT:      Head: Normocephalic.   Eyes:      Conjunctiva/sclera: Conjunctivae normal.   Cardiovascular:      Rate and Rhythm: Normal rate and regular rhythm.      Pulses: Normal pulses.   Pulmonary:      Effort: Pulmonary effort is normal.      Breath sounds: Normal breath sounds.   Abdominal:      Palpations: Abdomen is soft.   Musculoskeletal:         General: Normal range of motion.      Cervical back: Neck supple.   Skin:     General: Skin is warm and dry.   Neurological:      General: No focal deficit present.   Psychiatric:         Mood and Affect: Mood normal.       /72 (BP Location: Right arm, Patient Position: Sitting, BP Cuff Size: Adult)   Pulse 56   Temp 36.7 °C (98 °F) (Temporal)   Ht 1.854 m (6' 1\")   Wt 88.5 kg (195 lb)   BMI 25.73 kg/m²      Assessment/Plan   Problem List Items Addressed This Visit       Paroxysmal atrial fibrillation (Multi) - Primary    Essential hypertension    Hyperlipidemia   No pericarditis, no chest pain, he could have a spells of A-fib he was not sure, remains on Eliquis, MR angiogram was done, morphology of the wall seems to be normal, slightly dilated ascending aorta it is not going to be an issue, no pericardial rub, no abnormal findings, weight is unchanged, he could have a problem with the naming occasionally, continue medications as listed, supplements are appropriate, BP readings are excellent, small dose of statin has been working well.  Stool for FIT test was done in 2023, could require another 1 at the next visit, patient's care has been provided by me on a long-term basis with management of the chronic problems and if any acute problem arise on ongoing basis, no clinical abnormalities or concerns, follow-up in 4 months.  Laboratories before next visit.       [1]   Past Medical " History:  Diagnosis Date    HL (hearing loss) mild, past 5 years   [2]   Social History  Tobacco Use    Smoking status: Never     Passive exposure: Never    Smokeless tobacco: Never   Vaping Use    Vaping status: Never Used   Substance Use Topics    Alcohol use: Not Currently    Drug use: Not Currently     Types: Marijuana     Comment: long time ago   [3]   Family History  Problem Relation Name Age of Onset    Arthritis Mother EP     Breast cancer Mother EP     Cancer Mother EP     Heart disease Mother EP     Angina Mother EP     Arthritis Father DLP     Stroke Father DLP    [4]   Allergies  Allergen Reactions    Cortisone Swelling    Rosuvastatin Swelling     Patient is sensative to statin not allergic, low dosing needed    Simvastatin Swelling     Patient is sensative to statin not allergic, low dosing needed    Statins-Hmg-Coa Reductase Inhibitors Swelling     Patient is sensative to statin not allergic, low dosing needed

## 2025-10-20 ENCOUNTER — APPOINTMENT (OUTPATIENT)
Dept: PRIMARY CARE | Facility: CLINIC | Age: 75
End: 2025-10-20
Payer: MEDICARE

## 2025-11-11 ENCOUNTER — APPOINTMENT (OUTPATIENT)
Dept: CARDIOLOGY | Facility: CLINIC | Age: 75
End: 2025-11-11
Payer: MEDICARE

## 2026-02-20 ENCOUNTER — APPOINTMENT (OUTPATIENT)
Dept: PRIMARY CARE | Facility: CLINIC | Age: 76
End: 2026-02-20
Payer: MEDICARE